# Patient Record
Sex: MALE | Race: WHITE | NOT HISPANIC OR LATINO | Employment: UNEMPLOYED | URBAN - METROPOLITAN AREA
[De-identification: names, ages, dates, MRNs, and addresses within clinical notes are randomized per-mention and may not be internally consistent; named-entity substitution may affect disease eponyms.]

---

## 2023-10-30 DIAGNOSIS — E11.69 TYPE 2 DIABETES MELLITUS WITH OTHER SPECIFIED COMPLICATION, WITHOUT LONG-TERM CURRENT USE OF INSULIN (MULTI): Primary | ICD-10-CM

## 2023-10-30 RX ORDER — ORAL SEMAGLUTIDE 7 MG/1
7 TABLET ORAL DAILY
Qty: 90 TABLET | Refills: 3 | Status: SHIPPED | OUTPATIENT
Start: 2023-10-30 | End: 2024-04-12 | Stop reason: SDUPTHER

## 2023-11-20 PROCEDURE — 88304 TISSUE EXAM BY PATHOLOGIST: CPT

## 2023-11-20 PROCEDURE — 88304 TISSUE EXAM BY PATHOLOGIST: CPT | Performed by: PATHOLOGY

## 2023-11-21 ENCOUNTER — LAB REQUISITION (OUTPATIENT)
Dept: LAB | Facility: HOSPITAL | Age: 68
End: 2023-11-21
Payer: COMMERCIAL

## 2023-11-29 LAB
LABORATORY COMMENT REPORT: NORMAL
PATH REPORT.FINAL DX SPEC: NORMAL
PATH REPORT.GROSS SPEC: NORMAL
PATH REPORT.TOTAL CANCER: NORMAL

## 2023-12-29 ENCOUNTER — APPOINTMENT (OUTPATIENT)
Dept: PRIMARY CARE | Facility: CLINIC | Age: 68
End: 2023-12-29
Payer: COMMERCIAL

## 2023-12-29 PROBLEM — M72.0 DUPUYTREN'S DISEASE: Status: ACTIVE | Noted: 2023-12-29

## 2023-12-29 PROBLEM — C44.519 BASAL CELL CARCINOMA OF SKIN OF OTHER PART OF TRUNK: Status: ACTIVE | Noted: 2022-04-11

## 2023-12-29 PROBLEM — L90.5 SCAR CONDITION AND FIBROSIS OF SKIN: Status: ACTIVE | Noted: 2022-04-11

## 2023-12-29 PROBLEM — I10 ESSENTIAL (PRIMARY) HYPERTENSION: Status: ACTIVE | Noted: 2021-07-27

## 2023-12-29 RX ORDER — METFORMIN HYDROCHLORIDE 500 MG/1
500 TABLET ORAL
COMMUNITY
End: 2024-02-21 | Stop reason: SDUPTHER

## 2023-12-29 RX ORDER — ISOPROPYL ALCOHOL 0.75 G/1
SWAB TOPICAL
COMMUNITY
Start: 2023-09-11 | End: 2024-05-15

## 2023-12-29 RX ORDER — ATORVASTATIN CALCIUM 10 MG/1
10 TABLET, FILM COATED ORAL DAILY
COMMUNITY
End: 2024-01-10

## 2023-12-29 RX ORDER — LEVOCETIRIZINE DIHYDROCHLORIDE 5 MG/1
5 TABLET, FILM COATED ORAL
COMMUNITY

## 2023-12-29 RX ORDER — GABAPENTIN 300 MG/1
300 CAPSULE ORAL
COMMUNITY
End: 2024-04-12 | Stop reason: SDUPTHER

## 2023-12-29 RX ORDER — TAMSULOSIN HYDROCHLORIDE 0.4 MG/1
0.4 CAPSULE ORAL
COMMUNITY
End: 2024-02-15

## 2023-12-29 RX ORDER — FLUTICASONE PROPIONATE 50 MCG
SPRAY, SUSPENSION (ML) NASAL
COMMUNITY

## 2023-12-29 RX ORDER — LORATADINE 10 MG/1
10 TABLET ORAL
COMMUNITY
Start: 2023-09-08

## 2023-12-29 RX ORDER — GLIMEPIRIDE 4 MG/1
4 TABLET ORAL
COMMUNITY
Start: 2017-04-21 | End: 2024-03-13 | Stop reason: SDUPTHER

## 2024-01-02 ENCOUNTER — OFFICE VISIT (OUTPATIENT)
Dept: PRIMARY CARE | Facility: CLINIC | Age: 69
End: 2024-01-02
Payer: COMMERCIAL

## 2024-01-02 VITALS
RESPIRATION RATE: 16 BRPM | OXYGEN SATURATION: 98 % | TEMPERATURE: 97.3 F | DIASTOLIC BLOOD PRESSURE: 80 MMHG | HEART RATE: 80 BPM | SYSTOLIC BLOOD PRESSURE: 122 MMHG | WEIGHT: 231 LBS

## 2024-01-02 DIAGNOSIS — E11.65 TYPE 2 DIABETES MELLITUS WITH HYPERGLYCEMIA, WITHOUT LONG-TERM CURRENT USE OF INSULIN (MULTI): ICD-10-CM

## 2024-01-02 DIAGNOSIS — Z00.00 MEDICARE ANNUAL WELLNESS VISIT, SUBSEQUENT: ICD-10-CM

## 2024-01-02 DIAGNOSIS — I10 ESSENTIAL (PRIMARY) HYPERTENSION: ICD-10-CM

## 2024-01-02 DIAGNOSIS — N40.1 BENIGN PROSTATIC HYPERPLASIA WITH URINARY FREQUENCY: ICD-10-CM

## 2024-01-02 DIAGNOSIS — R35.0 BENIGN PROSTATIC HYPERPLASIA WITH URINARY FREQUENCY: ICD-10-CM

## 2024-01-02 DIAGNOSIS — E78.5 HYPERLIPIDEMIA, UNSPECIFIED HYPERLIPIDEMIA TYPE: ICD-10-CM

## 2024-01-02 DIAGNOSIS — R23.8 OTHER SKIN CHANGES: Primary | ICD-10-CM

## 2024-01-02 DIAGNOSIS — M72.0 DUPUYTREN'S DISEASE: ICD-10-CM

## 2024-01-02 DIAGNOSIS — C44.519 BASAL CELL CARCINOMA OF SKIN OF OTHER PART OF TRUNK: ICD-10-CM

## 2024-01-02 DIAGNOSIS — M24.541 CONTRACTURE OF HAND JOINT, RIGHT: ICD-10-CM

## 2024-01-02 DIAGNOSIS — Z29.11 NEED FOR RSV VACCINATION: ICD-10-CM

## 2024-01-02 DIAGNOSIS — L90.5 SCAR CONDITION AND FIBROSIS OF SKIN: ICD-10-CM

## 2024-01-02 PROCEDURE — 3074F SYST BP LT 130 MM HG: CPT | Performed by: FAMILY MEDICINE

## 2024-01-02 PROCEDURE — 1159F MED LIST DOCD IN RCRD: CPT | Performed by: FAMILY MEDICINE

## 2024-01-02 PROCEDURE — 3079F DIAST BP 80-89 MM HG: CPT | Performed by: FAMILY MEDICINE

## 2024-01-02 PROCEDURE — 1036F TOBACCO NON-USER: CPT | Performed by: FAMILY MEDICINE

## 2024-01-02 PROCEDURE — 99214 OFFICE O/P EST MOD 30 MIN: CPT | Performed by: FAMILY MEDICINE

## 2024-01-02 RX ORDER — TADALAFIL 5 MG/1
TABLET ORAL
COMMUNITY

## 2024-01-02 ASSESSMENT — ENCOUNTER SYMPTOMS
OCCASIONAL FEELINGS OF UNSTEADINESS: 0
DEPRESSION: 1
LOSS OF SENSATION IN FEET: 1

## 2024-01-02 NOTE — PROGRESS NOTES
"Subjective   Patient ID: Valdemar Cohen is a 68 y.o. male who presents for Follow-up (Follow up from surgery /Med refills/Rsv shot ).  ILIANA IS HERE WITH MEDICAL QUESTIONS:    AWV 2022    QUESTIONS:   1--PT HAD RT HAND CONTRACTURE RELEASE SURGERY WITH DR AMBROSIO: 2023 AND NO COVERAGE FOR THERAPIES.  #4-5 VISITS WITH DR AMBROSIO.    [facility] COVERAGE FOR AFTER CARE - YOU NEED TO MAKE A STINK.    2--DRY HEAVING RELIEF PILL NOT COVERED.  PT GOT AN EXTRA SHOT.    3--ASKS FOR FLU SHOT.  \"I NEVER GET THE FLU...\"  PLZ GET FLU SHOT.   4--PLEASE GET AREZVY RSV SHOT.  Sent order.  5--look at fibromas at left lateral corner of left eye: to derm  6--yes keep on tadalafil 5 mg every day.            FROM MEDENT:  IMS: Date: 22   HISTORY SUMMARY - [facility] INTERNAL MEDICINE SPEC       Name:  Valdemar Choen                             Acct#: [id number]    Sex: M  : 1955  Age: 66 years           PMH:  Immun/Inj. Record:  91300-Covid 19 Pfizer Sars-Cov-2 vac mRNA, LNP-S, PF, 30 mcg/ 0.3 mL 21  -Inj Rocephin 250 MG NDC [id number] 17  90750-Shingrix (Shingles) Vaccine Recombinant  [address]-23 19  29860-Idfrujcg  0.65 ML [id number] 17  Health Maintenance:  Colonoscopy - LONG TIME AGO   Psa Test - DR AGUILAR HANDLING   Tetanus - WITHIN 10 YRS   Flu Shot - DOES NOT GET   Pnmovax -   EKG - (2017) NSR 83  HgbA1C Screening - (2020) aic:2020: 5.7%; 3/'19:7.6%; : 5.2%; : 7.6%  Zostavax - 2017  Shingrix - (2018)  [id number] - ? WHEN ADMINISTERED   : FLU SHOT ENCOURAGED:  PT DID NOT GET THIS SHINGLES SHOT DONE;  [id number] AND PNVAX 23 UP TO DATE AT [facility]  HgbA1C Screening - (5/10/2022) 6.6% M AY   Micro Albumin - (2022) UR ALB <7.0 & UR Cr 113.0 LOOKS OK...  Medical Problems:  Arthritis - ? LEFT SHOULDER DISLOCATIONS  Headache, Migraine  Prostate Disease - UR RETENTION S/P SURGERY DR AGUILAR  BENIGN PROSTATIC " HYPERTROPHY  Surgical Hx:  Knee Surgery - DR HODGES   Bladder Surgery - 0CT 2015  Wrist - LEFT GLASS LACERATION LEFT THUMB RECONSTRUCTION    FH:  Father: UNKNOWN  DAD AND MOM LEFT HIM AT EARLY AGE .    SH:  Marital: Single.Occupation: [employer].Sleep: Typically sleeps 8 hours a night.  Personal Habits:  Cigarette Use: Never Smoked Cigarettes.Alcohol: Occasional Alcoholic Beverage.Exercise Type: Exercises sporadically.          Review of Systems    Objective   Physical Exam  Vitals and nursing note reviewed.   Constitutional:       Appearance: Normal appearance.      Comments: Pt has ? Fibroma at left eye lateral corner and ? Ulcerated?  Hx of basal cell and other spots across his face.  Missing many teeth and engaging well.      HENT:      Head: Normocephalic.      Right Ear: Tympanic membrane, ear canal and external ear normal.      Left Ear: Tympanic membrane, ear canal and external ear normal.      Nose: Nose normal.      Mouth/Throat:      Mouth: Mucous membranes are moist.   Eyes:      Extraocular Movements: Extraocular movements intact.      Conjunctiva/sclera: Conjunctivae normal.      Pupils: Pupils are equal, round, and reactive to light.   Cardiovascular:      Rate and Rhythm: Normal rate and regular rhythm.      Pulses: Normal pulses.      Heart sounds: Normal heart sounds.   Pulmonary:      Effort: Pulmonary effort is normal.      Breath sounds: Normal breath sounds.   Abdominal:      General: Bowel sounds are normal.      Palpations: Abdomen is soft.   Musculoskeletal:         General: Normal range of motion.      Cervical back: Normal range of motion and neck supple.      Comments: SEE ABOVE:  PUFFINESS AT MCP JOINTS ACROSS RT PALM AT 4-5TH FINGERS    Skin:     General: Skin is warm and dry.      Comments: Changes as above;  palmar LEFT HAND ALONG THE 4TH RAY PUFFINESS TENDER AND TENSE WITH HEALED #4 INCISIONS.     Neurological:      General: No focal deficit present.   Psychiatric:         Mood and Affect:  Mood normal.         Assessment/Plan   Diagnoses and all orders for this visit:  Other skin changes  -     Referral to Dermatology  Medicare annual wellness visit, subsequent  -     Follow Up In Primary Care - Medicare Annual; Future  Contracture of hand joint, right  -     Referral to Physical Therapy; Future  Need for RSV vaccination  -     RSV, Recombinant, 60 years and older (AREXVY)  Basal cell carcinoma of skin of other part of trunk  Dupuytren's disease  Essential (primary) hypertension  Scar condition and fibrosis of skin  Benign prostatic hyperplasia with urinary frequency             .VS

## 2024-01-10 ENCOUNTER — APPOINTMENT (OUTPATIENT)
Dept: PHYSICAL THERAPY | Facility: CLINIC | Age: 69
End: 2024-01-10
Payer: COMMERCIAL

## 2024-01-10 DIAGNOSIS — N40.1 BENIGN PROSTATIC HYPERPLASIA WITH URINARY FREQUENCY: ICD-10-CM

## 2024-01-10 DIAGNOSIS — I10 ESSENTIAL (PRIMARY) HYPERTENSION: ICD-10-CM

## 2024-01-10 DIAGNOSIS — E78.5 HYPERLIPIDEMIA, UNSPECIFIED HYPERLIPIDEMIA TYPE: ICD-10-CM

## 2024-01-10 DIAGNOSIS — R35.0 BENIGN PROSTATIC HYPERPLASIA WITH URINARY FREQUENCY: ICD-10-CM

## 2024-01-10 DIAGNOSIS — E11.65 TYPE 2 DIABETES MELLITUS WITH HYPERGLYCEMIA, WITHOUT LONG-TERM CURRENT USE OF INSULIN (MULTI): ICD-10-CM

## 2024-01-10 RX ORDER — ATORVASTATIN CALCIUM 10 MG/1
10 TABLET, FILM COATED ORAL DAILY
Qty: 90 TABLET | Refills: 0 | Status: SHIPPED | OUTPATIENT
Start: 2024-01-10 | End: 2024-04-12 | Stop reason: SDUPTHER

## 2024-01-15 ENCOUNTER — EVALUATION (OUTPATIENT)
Dept: PHYSICAL THERAPY | Facility: CLINIC | Age: 69
End: 2024-01-15
Payer: COMMERCIAL

## 2024-01-15 DIAGNOSIS — I10 ESSENTIAL (PRIMARY) HYPERTENSION: ICD-10-CM

## 2024-01-15 DIAGNOSIS — E11.65 TYPE 2 DIABETES MELLITUS WITH HYPERGLYCEMIA, WITHOUT LONG-TERM CURRENT USE OF INSULIN (MULTI): ICD-10-CM

## 2024-01-15 DIAGNOSIS — R35.0 BENIGN PROSTATIC HYPERPLASIA WITH URINARY FREQUENCY: ICD-10-CM

## 2024-01-15 DIAGNOSIS — N40.1 BENIGN PROSTATIC HYPERPLASIA WITH URINARY FREQUENCY: ICD-10-CM

## 2024-01-15 DIAGNOSIS — M24.541 CONTRACTURE OF HAND JOINT, RIGHT: ICD-10-CM

## 2024-01-15 PROCEDURE — 97162 PT EVAL MOD COMPLEX 30 MIN: CPT | Performed by: PHYSICAL THERAPIST

## 2024-01-15 PROCEDURE — 97140 MANUAL THERAPY 1/> REGIONS: CPT | Performed by: PHYSICAL THERAPIST

## 2024-01-15 NOTE — PROGRESS NOTES
Physical Therapy     Physical Therapy Evaluation and Treatment      Patient Name:   Valdemar Cohen   1955     Encounter Diagnoses   Name Primary?    Contracture of hand joint, right     Essential (primary) hypertension     Benign prostatic hyperplasia with urinary frequency     Type 2 diabetes mellitus with hyperglycemia, without long-term current use of insulin (CMS/[facility])         THERAPY VISIT NUMBER:    1    Today's Date:   1-15-24    REFERRING DR. SHANE     SUBJECTIVE:       PATIENT HAD  P/O RIGHT HAND SURGERY FOR CONTRACTURES BY DR AMBROSIO IN NOV 2023----HE LAST SAW DR AMBROSIO     GOALS:   DECREASE RIGHT HAND CONTRACTURE--D DAILY     OBJECTIVE:  SEVERE 4TH AND 5TH FINGER CONTRACTURE     ASSESSMENT:  TRY 2 X WEEKLY FOR RIGHT HAND ROM    PLAN AND TREATMENT:  SEE FLOW SHEET PROGRAM IN CHART:    1-2 X WEEKLY

## 2024-01-18 ENCOUNTER — TREATMENT (OUTPATIENT)
Dept: PHYSICAL THERAPY | Facility: CLINIC | Age: 69
End: 2024-01-18
Payer: COMMERCIAL

## 2024-01-18 DIAGNOSIS — M24.541 CONTRACTURE OF HAND JOINT, RIGHT: Primary | ICD-10-CM

## 2024-01-18 PROCEDURE — 97035 APP MDLTY 1+ULTRASOUND EA 15: CPT | Performed by: PHYSICAL THERAPIST

## 2024-01-18 PROCEDURE — 97110 THERAPEUTIC EXERCISES: CPT | Performed by: PHYSICAL THERAPIST

## 2024-01-18 NOTE — PROGRESS NOTES
Physical Therapy     Physical Therapy Evaluation and Treatment      Patient Name:  Valdemar Cohen   1955    Encounter Diagnosis   Name Primary?    Contracture of hand joint, right Yes        THERAPY VISIT NUMBER:   2    Today's Date: 1-18-24    REFERRING DR. CENTENO     SUBJECTIVE:         SEVERE RIGHT HAND SCAR TISSUE     GOALS:DECREASE HAND CONTRACTURE SO 4TH AND 5TH FINGERS MOVE BETTER     OBJECTIVE: SEE PICTURE     ASSESSMENT: DEBILITY    PLAN AND TREATMENT:  SEE FLOW SHEET PROGRAM IN CHART:    1-2 X WEEKLY

## 2024-01-22 ENCOUNTER — APPOINTMENT (OUTPATIENT)
Dept: PHYSICAL THERAPY | Facility: CLINIC | Age: 69
End: 2024-01-22
Payer: COMMERCIAL

## 2024-01-25 ENCOUNTER — DOCUMENTATION (OUTPATIENT)
Dept: PHYSICAL THERAPY | Facility: CLINIC | Age: 69
End: 2024-01-25

## 2024-01-25 NOTE — PROGRESS NOTES
Physical Therapy                 Therapy Communication Note    Patient Name: Valdemar Cohen  MRN: [id number]  Today's Date: 2024         Comment:  CANCELLED--I HAVE A FAMILY  TO GO TO TODAY

## 2024-02-02 ENCOUNTER — APPOINTMENT (OUTPATIENT)
Dept: PRIMARY CARE | Facility: CLINIC | Age: 69
End: 2024-02-02
Payer: COMMERCIAL

## 2024-02-08 ENCOUNTER — APPOINTMENT (OUTPATIENT)
Dept: PHYSICAL THERAPY | Facility: CLINIC | Age: 69
End: 2024-02-08
Payer: COMMERCIAL

## 2024-02-15 DIAGNOSIS — N40.1 BENIGN PROSTATIC HYPERPLASIA WITH URINARY FREQUENCY: ICD-10-CM

## 2024-02-15 DIAGNOSIS — R35.0 BENIGN PROSTATIC HYPERPLASIA WITH URINARY FREQUENCY: ICD-10-CM

## 2024-02-15 DIAGNOSIS — E11.65 TYPE 2 DIABETES MELLITUS WITH HYPERGLYCEMIA, WITHOUT LONG-TERM CURRENT USE OF INSULIN (MULTI): ICD-10-CM

## 2024-02-15 DIAGNOSIS — L90.5 SCAR CONDITION AND FIBROSIS OF SKIN: Primary | ICD-10-CM

## 2024-02-15 DIAGNOSIS — I10 ESSENTIAL (PRIMARY) HYPERTENSION: ICD-10-CM

## 2024-02-15 RX ORDER — DOXYCYCLINE HYCLATE 50 MG/1
50 CAPSULE ORAL
Qty: 90 CAPSULE | Refills: 0 | Status: SHIPPED | OUTPATIENT
Start: 2024-02-15 | End: 2024-04-24

## 2024-02-15 RX ORDER — TAMSULOSIN HYDROCHLORIDE 0.4 MG/1
CAPSULE ORAL
Qty: 180 CAPSULE | Refills: 0 | Status: SHIPPED | OUTPATIENT
Start: 2024-02-15 | End: 2024-04-12 | Stop reason: SDUPTHER

## 2024-02-19 ENCOUNTER — DOCUMENTATION (OUTPATIENT)
Dept: PHYSICAL THERAPY | Facility: CLINIC | Age: 69
End: 2024-02-19
Payer: COMMERCIAL

## 2024-02-19 DIAGNOSIS — M24.541 CONTRACTURE OF HAND JOINT, RIGHT: Primary | ICD-10-CM

## 2024-02-19 NOTE — PROGRESS NOTES
Physical Therapy    Discharge Summary       Encounter Diagnosis   Name Primary?    Contracture of hand joint, right Yes      MRN: [id number]  : 1955  Date: 24        Rehab Discharge Reason: Other PATIENT WAS SCHEDULED TO HAVE A SECOND SURGERY ON HIS HAND DUE TO SEVERE CONTRACTURES---PLAN--D/C PT UNTIL AFTER HIS SECOND SURGERY

## 2024-02-21 ENCOUNTER — OFFICE VISIT (OUTPATIENT)
Dept: PRIMARY CARE | Facility: CLINIC | Age: 69
End: 2024-02-21
Payer: COMMERCIAL

## 2024-02-21 VITALS
TEMPERATURE: 98.6 F | SYSTOLIC BLOOD PRESSURE: 110 MMHG | DIASTOLIC BLOOD PRESSURE: 80 MMHG | HEART RATE: 76 BPM | WEIGHT: 230 LBS

## 2024-02-21 DIAGNOSIS — M72.0 DUPUYTREN'S DISEASE: Primary | ICD-10-CM

## 2024-02-21 DIAGNOSIS — I10 ESSENTIAL (PRIMARY) HYPERTENSION: ICD-10-CM

## 2024-02-21 DIAGNOSIS — R35.0 BENIGN PROSTATIC HYPERPLASIA WITH URINARY FREQUENCY: ICD-10-CM

## 2024-02-21 DIAGNOSIS — E11.65 TYPE 2 DIABETES MELLITUS WITH HYPERGLYCEMIA, WITHOUT LONG-TERM CURRENT USE OF INSULIN (MULTI): ICD-10-CM

## 2024-02-21 DIAGNOSIS — N40.1 BENIGN PROSTATIC HYPERPLASIA WITH URINARY FREQUENCY: ICD-10-CM

## 2024-02-21 DIAGNOSIS — Z00.00 MEDICARE ANNUAL WELLNESS VISIT, SUBSEQUENT: ICD-10-CM

## 2024-02-21 PROCEDURE — 3079F DIAST BP 80-89 MM HG: CPT | Performed by: FAMILY MEDICINE

## 2024-02-21 PROCEDURE — 1036F TOBACCO NON-USER: CPT | Performed by: FAMILY MEDICINE

## 2024-02-21 PROCEDURE — 1159F MED LIST DOCD IN RCRD: CPT | Performed by: FAMILY MEDICINE

## 2024-02-21 PROCEDURE — 3074F SYST BP LT 130 MM HG: CPT | Performed by: FAMILY MEDICINE

## 2024-02-21 PROCEDURE — 99214 OFFICE O/P EST MOD 30 MIN: CPT | Performed by: FAMILY MEDICINE

## 2024-02-21 RX ORDER — METFORMIN HYDROCHLORIDE 500 MG/1
1000 TABLET ORAL
Qty: 360 TABLET | Refills: 3 | Status: SHIPPED | OUTPATIENT
Start: 2024-02-21 | End: 2024-04-12 | Stop reason: SDUPTHER

## 2024-02-21 NOTE — PROGRESS NOTES
Subjective   Patient ID: Valdemar Cohen is a 68 y.o. male who presents for Diabetes (Discuss lowering A1c. Had surgery cancelled due to high A1c. ).  PT HAD CONTRACTURE REPAIR IN THE PAST AND NOW WORSE THAN EVER.   Discuss lowering A1c. Had surgery cancelled due to high A1c.  CONCERNS FOR INFXN AND NEEDS CARE.    EDUC FOR INSULIN RESISTANCE AND CARE FOR DM2.   TESTING SUGARS HIGHER IN THE Ams.   TRIED DIET CHANGES:  NO CANDY PIE Jacqueline.   MORE EXERCISE TOO.    GOAL AIC:  UNDER 8%.    PREOP TESTING AIC 9.0%  INTERVAL DERM CARE BY [facility] FROZE SPOT ON FACE AND CAUTERY ON DORSAL LEFT WRIST HEALING SCAB WELL NOW.      Diabetes    TX:   RYBELSUS 7 MG MIGHT USE 14 MG every day MAX DOSE.    [SEMAGLUTIDE/WEGOVY/OZEMPIC]  METFORMIN 1000 BID  GLIMEPIRIDE 4 MG Q AM    OPTIONS FOR ADDITIONAL DM2 TX:    JARDIANCE/FARXIGA   ACTOS  STARLIX [hazardous]   LANTUS INSULIN as A LAST RESORT.      RETRUN IN 1 MONTH. GET AIC AT NEXT OV    Review of Systems   All other systems reviewed and are negative.      Objective   Physical Exam  Vitals and nursing note reviewed.   Constitutional:       Appearance: Normal appearance.      Comments: MISSING TEETH AND OK BREATH.  HEENT OK & CVS AND LUNGS ABD AND NO CVAT.  SKIN SCABS AND SCARING NOTED.  TALKATIVE LIKE ALWAYS.           Assessment/Plan   Diagnoses and all orders for this visit:  Dupuytren's disease  Medicare annual wellness visit, subsequent  Type 2 diabetes mellitus with hyperglycemia, without long-term current use of insulin (CMS/[facility])  -     metFORMIN (Glucophage) 500 mg tablet; Take 2 tablets (1,000 mg) by mouth 2 times a day with meals.  -     empagliflozin (Jardiance) 10 mg; Take 1 tablet (10 mg) by mouth once daily.  Essential (primary) hypertension  -     metFORMIN (Glucophage) 500 mg tablet; Take 2 tablets (1,000 mg) by mouth 2 times a day with meals.  Benign prostatic hyperplasia with urinary frequency  -     metFORMIN (Glucophage) 500 mg tablet; Take 2 tablets (1,000 mg) by mouth 2  times a day with meals.           .VS

## 2024-02-21 NOTE — PATIENT INSTRUCTIONS
WE ADDED TREATMENT FOR DIABETES TODAY TO KEEP ON MEDICATIONS:   TX: JARDIANCE 25 MG ONCE A DAY.   MAINTAIN TREATMENT:   RYBELSUS 7 MG [MIGHT USE 14 MG every day MAX DOSE. ] [SAME FAMILY as SEMAGLUTIDE/WEGOVY/OZEMPIC]  METFORMIN 1000 TWICE A DAY.    GLIMEPIRIDE 4 MG EVERY AM:  IF YOU SKIP A MEAL THEN SKIP THIS PILL.      OPTIONS FOR ADDITIONAL DM2 TX:    MAX RYBELSUS TO 14 MG DAILY   JARDIANCE/FARXIGA   ACTOS  STARLIX [hazardous]   LANTUS INSULIN as A LAST RESORT.]      GOAL A1c FOR SURGERY IS < 8%.     CALL FOR NEEDS [phone].

## 2024-03-13 ENCOUNTER — OFFICE VISIT (OUTPATIENT)
Dept: PRIMARY CARE | Facility: CLINIC | Age: 69
End: 2024-03-13
Payer: COMMERCIAL

## 2024-03-13 VITALS
SYSTOLIC BLOOD PRESSURE: 117 MMHG | DIASTOLIC BLOOD PRESSURE: 79 MMHG | RESPIRATION RATE: 16 BRPM | WEIGHT: 228 LBS | TEMPERATURE: 96.7 F | OXYGEN SATURATION: 96 % | HEART RATE: 74 BPM

## 2024-03-13 DIAGNOSIS — N40.1 BENIGN PROSTATIC HYPERPLASIA WITH URINARY FREQUENCY: ICD-10-CM

## 2024-03-13 DIAGNOSIS — Z12.11 COLON CANCER SCREENING: Primary | ICD-10-CM

## 2024-03-13 DIAGNOSIS — R35.0 BENIGN PROSTATIC HYPERPLASIA WITH URINARY FREQUENCY: ICD-10-CM

## 2024-03-13 DIAGNOSIS — M72.0 DUPUYTREN'S DISEASE: ICD-10-CM

## 2024-03-13 DIAGNOSIS — E11.65 TYPE 2 DIABETES MELLITUS WITH HYPERGLYCEMIA, WITHOUT LONG-TERM CURRENT USE OF INSULIN (MULTI): ICD-10-CM

## 2024-03-13 DIAGNOSIS — I10 ESSENTIAL (PRIMARY) HYPERTENSION: ICD-10-CM

## 2024-03-13 LAB
POC FINGERSTICK BLOOD GLUCOSE: 173 MG/DL (ref 70–100)
POC HEMOGLOBIN A1C: 8.7 % (ref 4.2–6.5)

## 2024-03-13 PROCEDURE — 1036F TOBACCO NON-USER: CPT | Performed by: FAMILY MEDICINE

## 2024-03-13 PROCEDURE — 1159F MED LIST DOCD IN RCRD: CPT | Performed by: FAMILY MEDICINE

## 2024-03-13 PROCEDURE — 3078F DIAST BP <80 MM HG: CPT | Performed by: FAMILY MEDICINE

## 2024-03-13 PROCEDURE — 83036 HEMOGLOBIN GLYCOSYLATED A1C: CPT | Performed by: FAMILY MEDICINE

## 2024-03-13 PROCEDURE — 82962 GLUCOSE BLOOD TEST: CPT | Performed by: FAMILY MEDICINE

## 2024-03-13 PROCEDURE — 3074F SYST BP LT 130 MM HG: CPT | Performed by: FAMILY MEDICINE

## 2024-03-13 PROCEDURE — 99214 OFFICE O/P EST MOD 30 MIN: CPT | Performed by: FAMILY MEDICINE

## 2024-03-13 RX ORDER — FLASH GLUCOSE SENSOR
KIT MISCELLANEOUS
Qty: 1 EACH | Refills: 0 | Status: SHIPPED | OUTPATIENT
Start: 2024-03-13

## 2024-03-13 RX ORDER — FLASH GLUCOSE SCANNING READER
EACH MISCELLANEOUS
Qty: 1 EACH | Refills: 0 | Status: SHIPPED | OUTPATIENT
Start: 2024-03-13

## 2024-03-13 RX ORDER — GLIMEPIRIDE 4 MG/1
4 TABLET ORAL 2 TIMES DAILY
Qty: 180 TABLET | Refills: 3 | Status: SHIPPED | OUTPATIENT
Start: 2024-03-13 | End: 2024-04-12 | Stop reason: SDUPTHER

## 2024-03-13 NOTE — PATIENT INSTRUCTIONS
Keep on best diet and nutrition consulted.    Keep on medications especially for diabetes:    SUGARS RUNNING 130-170 RANGE.    AIC GOAL < 7% today 8.9%   JARDIANCE ONCE A DAY.   AMARYL 4 MG FOR BREAKFAST AND DINNER.  METFORMIN FOR BREAKFAST AND DINNER  RYBELSUS ONCE A DAY   .    Pt must lower aic to clear for hand surgery, more aggressive tx may trigger severe hypoglycemia so pt has medical need for CGM and care to prevent harm of low sugars.      Follow up with ANASTACIO CNP for diabetes education.  Diet controls and may need insulin tx.

## 2024-03-13 NOTE — PROGRESS NOTES
Subjective   Patient ID: Valdemar Cohen is a 68 y.o. male who presents for Follow-up (NEEDS A1C CHECKED FOR SURGERY ).  HPI  NEEDS DM2 CARE TO GET HAND SURGERY REVISION PER DR AMBROSIO   FASTING AND DIETARY INDISCRETIONS ARE BOTH LIKELY TO TRIGGER ELEVATED SUGARS.    SUGARS RUNNING 130-170 RANGE.    AIC GOAL < 7% today 8.9%   JARDIANCE ONCE A DAY.   AMARYL 4 MG FOR BREAKFAST AND DINNER.  METFORMIN FOR BREAKFAST AND DINNER  RYBELSUS ONCE A DAY   OFF ONE MED RECENTLY.      MUST DO THE PHYS TX POST OP THIS TIME AND NEXT TIME TOO.    2/'24: TOLD BY DR AMBROSIO TO HOLD RYBELSUS FOR 1 WEEK PREOP.  - PGS     NOW AND AIC 8.7%   NO LOW GLC REPORTED.      MIGHT NEED every day LANTUS 10 UNITS CONCERNS FOR WT GAIN AND HYPOGLYCEMIA.        Review of Systems   All other systems reviewed and are negative.      Objective   Physical Exam  Vitals reviewed.   Constitutional:       Appearance: Normal appearance. He is obese.   HENT:      Head: Normocephalic.      Right Ear: Tympanic membrane, ear canal and external ear normal.      Left Ear: Tympanic membrane, ear canal and external ear normal.      Nose: Nose normal.      Mouth/Throat:      Mouth: Mucous membranes are moist.      Pharynx: Oropharynx is clear.   Eyes:      Conjunctiva/sclera: Conjunctivae normal.      Pupils: Pupils are equal, round, and reactive to light.   Cardiovascular:      Rate and Rhythm: Normal rate and regular rhythm.      Pulses: Normal pulses.      Heart sounds: Normal heart sounds.   Pulmonary:      Effort: Pulmonary effort is normal.      Breath sounds: Normal breath sounds.   Abdominal:      General: Bowel sounds are normal.      Palpations: Abdomen is soft.   Musculoskeletal:         General: Normal range of motion.      Cervical back: Normal range of motion and neck supple.      Comments: Contractured 3rd ray across rt palm.     Skin:     General: Skin is warm and dry.   Neurological:      General: No focal deficit present.      Mental Status: He is  oriented to person, place, and time. Mental status is at baseline.   Psychiatric:         Mood and Affect: Mood normal.         Behavior: Behavior normal.         Thought Content: Thought content normal.         Judgment: Judgment normal.         Assessment/Plan   Diagnoses and all orders for this visit:  Colon cancer screening  -     Cologuard® colon cancer screening; Future  Type 2 diabetes mellitus with hyperglycemia, without long-term current use of insulin (CMS/[facility])  -     POCT glycosylated hemoglobin (Hb A1C) manually resulted  -     POCT Fingerstick Glucose manually resulted  -     glimepiride (Amaryl) 4 mg tablet; Take 1 tablet (4 mg) by mouth 2 times a day.  -     Follow Up In Primary Care - Established; Future  Essential (primary) hypertension  -     glimepiride (Amaryl) 4 mg tablet; Take 1 tablet (4 mg) by mouth 2 times a day.  Benign prostatic hyperplasia with urinary frequency  -     glimepiride (Amaryl) 4 mg tablet; Take 1 tablet (4 mg) by mouth 2 times a day.  Dupuytren's disease  -     Follow Up In Primary Care - Established; Future             .VS

## 2024-03-14 ENCOUNTER — TELEPHONE (OUTPATIENT)
Dept: PRIMARY CARE | Facility: CLINIC | Age: 69
End: 2024-03-14
Payer: COMMERCIAL

## 2024-03-26 NOTE — TELEPHONE ENCOUNTER
FREESTYLE DENIED  Decision Notes:  Continuous glucose monitor system (, transmitter, and sensor) is denied for not meeting the  prior authorization requirement(s). Product authorization requires the following:  (1) Submission of medical records (for example: chart notes, laboratory values) or claims history  documenting one of the following:  (A) You are being treated with insulin.  (B) You have a history of problematic hypoglycemia with documentation of recurrent level 2  hypoglycemic events [glucose less than 54mg/dl (3.0 mmol/L)] that persist despite multiple attempts to  adjust medication(s) and/or modify the diabetes treatment plan.  (C) You have a history of problematic hypoglycemia with documentation of a history of a level 3  hypoglycemic event [glucose less than 54mg/dl (3.0 mmol/L)] characterized by altered mental and/or  physical state requiring third-party assistance for treatment of hypoglycemia.  (2) The provider attests that you have had an in-person or Medicare-approved telehealth

## 2024-03-28 NOTE — TELEPHONE ENCOUNTER
Requested again for auth - sent records - had sent them last time also.  Noted that patient required  due to hypoglycemia

## 2024-04-12 ENCOUNTER — OFFICE VISIT (OUTPATIENT)
Dept: PRIMARY CARE | Facility: CLINIC | Age: 69
End: 2024-04-12
Payer: COMMERCIAL

## 2024-04-12 VITALS
DIASTOLIC BLOOD PRESSURE: 80 MMHG | RESPIRATION RATE: 16 BRPM | OXYGEN SATURATION: 96 % | WEIGHT: 230 LBS | TEMPERATURE: 96.9 F | SYSTOLIC BLOOD PRESSURE: 140 MMHG | BODY MASS INDEX: 32.93 KG/M2 | HEIGHT: 70 IN | HEART RATE: 80 BPM

## 2024-04-12 DIAGNOSIS — Z11.4 ENCOUNTER FOR SCREENING FOR HIV: ICD-10-CM

## 2024-04-12 DIAGNOSIS — Z00.01 ENCOUNTER FOR GENERAL ADULT MEDICAL EXAMINATION WITH ABNORMAL FINDINGS: Primary | ICD-10-CM

## 2024-04-12 DIAGNOSIS — M72.0 DUPUYTREN'S DISEASE: ICD-10-CM

## 2024-04-12 DIAGNOSIS — E55.9 VITAMIN D DEFICIENCY: ICD-10-CM

## 2024-04-12 DIAGNOSIS — N40.1 BENIGN PROSTATIC HYPERPLASIA WITH URINARY FREQUENCY: ICD-10-CM

## 2024-04-12 DIAGNOSIS — E11.69 TYPE 2 DIABETES MELLITUS WITH OTHER SPECIFIED COMPLICATION, WITHOUT LONG-TERM CURRENT USE OF INSULIN (MULTI): ICD-10-CM

## 2024-04-12 DIAGNOSIS — E78.5 HYPERLIPIDEMIA, UNSPECIFIED HYPERLIPIDEMIA TYPE: ICD-10-CM

## 2024-04-12 DIAGNOSIS — E11.65 TYPE 2 DIABETES MELLITUS WITH HYPERGLYCEMIA, WITHOUT LONG-TERM CURRENT USE OF INSULIN (MULTI): ICD-10-CM

## 2024-04-12 DIAGNOSIS — I10 ESSENTIAL (PRIMARY) HYPERTENSION: ICD-10-CM

## 2024-04-12 DIAGNOSIS — Z11.59 ENCOUNTER FOR HEPATITIS C SCREENING TEST FOR LOW RISK PATIENT: ICD-10-CM

## 2024-04-12 DIAGNOSIS — Z13.89 SCREENING FOR GOUT: ICD-10-CM

## 2024-04-12 DIAGNOSIS — R35.0 BENIGN PROSTATIC HYPERPLASIA WITH URINARY FREQUENCY: ICD-10-CM

## 2024-04-12 LAB
POC FINGERSTICK BLOOD GLUCOSE: 135 MG/DL (ref 70–100)
POC HEMOGLOBIN A1C: 6.8 % (ref 4.2–6.5)

## 2024-04-12 PROCEDURE — 83036 HEMOGLOBIN GLYCOSYLATED A1C: CPT | Performed by: FAMILY MEDICINE

## 2024-04-12 PROCEDURE — 99214 OFFICE O/P EST MOD 30 MIN: CPT | Performed by: FAMILY MEDICINE

## 2024-04-12 PROCEDURE — 82962 GLUCOSE BLOOD TEST: CPT | Performed by: FAMILY MEDICINE

## 2024-04-12 PROCEDURE — G0439 PPPS, SUBSEQ VISIT: HCPCS | Performed by: FAMILY MEDICINE

## 2024-04-12 PROCEDURE — 1159F MED LIST DOCD IN RCRD: CPT | Performed by: FAMILY MEDICINE

## 2024-04-12 PROCEDURE — 1160F RVW MEDS BY RX/DR IN RCRD: CPT | Performed by: FAMILY MEDICINE

## 2024-04-12 PROCEDURE — 3077F SYST BP >= 140 MM HG: CPT | Performed by: FAMILY MEDICINE

## 2024-04-12 PROCEDURE — 3079F DIAST BP 80-89 MM HG: CPT | Performed by: FAMILY MEDICINE

## 2024-04-12 PROCEDURE — 1170F FXNL STATUS ASSESSED: CPT | Performed by: FAMILY MEDICINE

## 2024-04-12 RX ORDER — TAMSULOSIN HYDROCHLORIDE 0.4 MG/1
CAPSULE ORAL
Qty: 180 CAPSULE | Refills: 3 | Status: SHIPPED | OUTPATIENT
Start: 2024-04-12

## 2024-04-12 RX ORDER — METFORMIN HYDROCHLORIDE 500 MG/1
1000 TABLET ORAL
Qty: 360 TABLET | Refills: 3 | Status: SHIPPED | OUTPATIENT
Start: 2024-04-12 | End: 2025-04-12

## 2024-04-12 RX ORDER — ATORVASTATIN CALCIUM 10 MG/1
10 TABLET, FILM COATED ORAL DAILY
Qty: 90 TABLET | Refills: 3 | Status: SHIPPED | OUTPATIENT
Start: 2024-04-12 | End: 2025-04-12

## 2024-04-12 RX ORDER — GLIMEPIRIDE 4 MG/1
4 TABLET ORAL 2 TIMES DAILY
Qty: 180 TABLET | Refills: 3 | Status: SHIPPED | OUTPATIENT
Start: 2024-04-12 | End: 2025-04-12

## 2024-04-12 RX ORDER — GABAPENTIN 300 MG/1
300 CAPSULE ORAL 2 TIMES DAILY PRN
Qty: 60 CAPSULE | Refills: 11 | Status: SHIPPED | OUTPATIENT
Start: 2024-04-12 | End: 2024-04-19 | Stop reason: SDUPTHER

## 2024-04-12 ASSESSMENT — ACTIVITIES OF DAILY LIVING (ADL)
DRESSING: INDEPENDENT
GROCERY_SHOPPING: INDEPENDENT
TAKING_MEDICATION: INDEPENDENT
MANAGING_FINANCES: INDEPENDENT
DOING_HOUSEWORK: INDEPENDENT
BATHING: INDEPENDENT

## 2024-04-12 ASSESSMENT — PATIENT HEALTH QUESTIONNAIRE - PHQ9
SUM OF ALL RESPONSES TO PHQ9 QUESTIONS 1 AND 2: 0
1. LITTLE INTEREST OR PLEASURE IN DOING THINGS: NOT AT ALL
2. FEELING DOWN, DEPRESSED OR HOPELESS: NOT AT ALL

## 2024-04-12 ASSESSMENT — ENCOUNTER SYMPTOMS
LOSS OF SENSATION IN FEET: 1
DEPRESSION: 0
OCCASIONAL FEELINGS OF UNSTEADINESS: 0

## 2024-04-12 NOTE — PROGRESS NOTES
Subjective   Patient ID: Valdemar Cohen is a 68 y.o. male who presents for Medicare Annual Wellness Visit Subsequent.  HPI  HERE FOR AWV  PALM SUGAR FAILED FOR LACK OF PHYS TX FLUP.   UPSET FOR HIS PALMAR HAND CONTRACTURE.    NEEDS AIC 7.1% OR LESS TO GET SURGERY DONE.    GOOD SUGAR 135.    AIC TODAY:  6.8% [8.2%]      JARDIANCE AND OTHER MEDS ONGOING.      12151:  11:17 TO 11:28        Review of Systems   All other systems reviewed and are negative.      Objective   Physical Exam  Vitals and nursing note reviewed.   Constitutional:       Appearance: Normal appearance.   HENT:      Head: Normocephalic.      Right Ear: Tympanic membrane, ear canal and external ear normal.      Left Ear: Tympanic membrane, ear canal and external ear normal.      Nose: Nose normal.      Mouth/Throat:      Mouth: Mucous membranes are moist.      Pharynx: Oropharynx is clear.   Eyes:      Conjunctiva/sclera: Conjunctivae normal.      Pupils: Pupils are equal, round, and reactive to light.   Cardiovascular:      Rate and Rhythm: Normal rate and regular rhythm.      Pulses: Normal pulses.      Heart sounds: Normal heart sounds.   Pulmonary:      Effort: Pulmonary effort is normal.      Breath sounds: Normal breath sounds.   Abdominal:      General: Bowel sounds are normal.   Musculoskeletal:         General: Normal range of motion.      Cervical back: Normal range of motion and neck supple.   Skin:     General: Skin is warm and dry.      Comments: CONTRACTURE RT PALM ALONG 4TH RAY.     Neurological:      General: No focal deficit present.      Mental Status: He is oriented to person, place, and time. Mental status is at baseline.   Psychiatric:         Mood and Affect: Mood normal.         Behavior: Behavior normal.         Thought Content: Thought content normal.         Judgment: Judgment normal.         Assessment/Plan   Diagnoses and all orders for this visit:  Encounter for general adult medical examination with abnormal  findings  -     Follow Up In Primary Care - Established; Future  -     Follow Up In Primary Care - Medicare Annual; Future  Type 2 diabetes mellitus with hyperglycemia, without long-term current use of insulin (Multi)  -     Follow Up In Primary Care - Established  -     POCT Fingerstick Glucose manually resulted  -     POCT glycosylated hemoglobin (Hb A1C) manually resulted  -     Referral to Orthopaedic Surgery; Future  -     Follow Up In Primary Care - Established; Future  -     Hemoglobin A1c; Future  -     HIV 1/2 Antigen/Antibody Screen with Reflex to Confirmation; Future  -     Hepatitis C antibody; Future  -     Prostate Specific Antigen, Screen; Future  -     Urinalysis with Reflex Microscopic; Future  -     TSH with reflex to Free T4 if abnormal; Future  -     Comprehensive Metabolic Panel; Future  -     CBC and Auto Differential; Future  Dupuytren's disease  -     Follow Up In Primary Care - Established  -     Referral to Orthopaedic Surgery; Future  Essential (primary) hypertension  -     Hemoglobin A1c; Future  -     Urinalysis with Reflex Microscopic; Future  -     TSH with reflex to Free T4 if abnormal; Future  -     Comprehensive Metabolic Panel; Future  -     CBC and Auto Differential; Future  Benign prostatic hyperplasia with urinary frequency  -     Prostate Specific Antigen, Screen; Future  Hyperlipidemia, unspecified hyperlipidemia type  Screening for gout  -     Uric acid; Future  Encounter for screening for HIV  -     HIV 1/2 Antigen/Antibody Screen with Reflex to Confirmation; Future  Encounter for hepatitis C screening test for low risk patient  -     Hepatitis C antibody; Future  Vitamin D deficiency  -     Vitamin D 25-Hydroxy,Total (for eval of Vitamin D levels); Future             .VS

## 2024-04-12 NOTE — PATIENT INSTRUCTIONS
KEEP ON MEDICATIONS.    GET FASTED BLOOD WORK DONE IN THE NEXT 3 MONTHS.  TRY JULY 2024.  VERY GOOD JOB ON YOUR BLOOD SUGAR CONTROL - KEEP GOING.    CALL DR AMBROSIO FOR SURGICAL APPOINTMENTS.    CALL FOR NEEDS [phone].

## 2024-04-19 ENCOUNTER — TELEPHONE (OUTPATIENT)
Dept: PRIMARY CARE | Facility: CLINIC | Age: 69
End: 2024-04-19
Payer: COMMERCIAL

## 2024-04-19 DIAGNOSIS — I10 ESSENTIAL (PRIMARY) HYPERTENSION: ICD-10-CM

## 2024-04-19 DIAGNOSIS — N40.1 BENIGN PROSTATIC HYPERPLASIA WITH URINARY FREQUENCY: ICD-10-CM

## 2024-04-19 DIAGNOSIS — R35.0 BENIGN PROSTATIC HYPERPLASIA WITH URINARY FREQUENCY: ICD-10-CM

## 2024-04-19 DIAGNOSIS — E11.65 TYPE 2 DIABETES MELLITUS WITH HYPERGLYCEMIA, WITHOUT LONG-TERM CURRENT USE OF INSULIN (MULTI): ICD-10-CM

## 2024-04-19 RX ORDER — GABAPENTIN 300 MG/1
300 CAPSULE ORAL 3 TIMES DAILY PRN
Qty: 90 CAPSULE | Refills: 3 | Status: SHIPPED | OUTPATIENT
Start: 2024-04-19 | End: 2024-08-17

## 2024-04-19 NOTE — TELEPHONE ENCOUNTER
Pharmacy sent over a fax stating that the gabapentin was 3 times a day and patient wasn't expecting a decrease in medication

## 2024-04-19 NOTE — PROGRESS NOTES
Subjective   Patient ID: Valdemar Cohen is a 68 y.o. male who presents for No chief complaint on file..  HPI    Review of Systems    Objective   Physical Exam    Assessment/Plan              .VS

## 2024-04-23 DIAGNOSIS — L90.5 SCAR CONDITION AND FIBROSIS OF SKIN: ICD-10-CM

## 2024-04-24 RX ORDER — DOXYCYCLINE HYCLATE 50 MG/1
50 CAPSULE ORAL
Qty: 90 CAPSULE | Refills: 0 | Status: SHIPPED | OUTPATIENT
Start: 2024-04-24

## 2024-05-13 PROCEDURE — 0752T DGTZ GLS MCRSCP SLD LVL III: CPT

## 2024-05-13 PROCEDURE — 88304 TISSUE EXAM BY PATHOLOGIST: CPT | Performed by: PATHOLOGY

## 2024-05-13 PROCEDURE — 88304 TISSUE EXAM BY PATHOLOGIST: CPT

## 2024-05-15 ENCOUNTER — LAB REQUISITION (OUTPATIENT)
Dept: LAB | Facility: HOSPITAL | Age: 69
End: 2024-05-15
Payer: COMMERCIAL

## 2024-05-15 DIAGNOSIS — I10 ESSENTIAL (PRIMARY) HYPERTENSION: ICD-10-CM

## 2024-05-15 DIAGNOSIS — R35.0 BENIGN PROSTATIC HYPERPLASIA WITH URINARY FREQUENCY: ICD-10-CM

## 2024-05-15 DIAGNOSIS — N40.1 BENIGN PROSTATIC HYPERPLASIA WITH URINARY FREQUENCY: ICD-10-CM

## 2024-05-15 DIAGNOSIS — E11.65 TYPE 2 DIABETES MELLITUS WITH HYPERGLYCEMIA, WITHOUT LONG-TERM CURRENT USE OF INSULIN (MULTI): ICD-10-CM

## 2024-05-15 DIAGNOSIS — M72.0 PALMAR FASCIAL FIBROMATOSIS (DUPUYTREN): ICD-10-CM

## 2024-05-15 RX ORDER — ISOPROPYL ALCOHOL 0.75 G/1
SWAB TOPICAL
Qty: 100 EACH | Refills: 11 | Status: SHIPPED | OUTPATIENT
Start: 2024-05-15

## 2024-05-21 LAB
LABORATORY COMMENT REPORT: NORMAL
PATH REPORT.FINAL DX SPEC: NORMAL
PATH REPORT.GROSS SPEC: NORMAL
PATH REPORT.RELEVANT HX SPEC: NORMAL
PATH REPORT.TOTAL CANCER: NORMAL

## 2024-06-04 ENCOUNTER — EVALUATION (OUTPATIENT)
Dept: PHYSICAL THERAPY | Facility: CLINIC | Age: 69
End: 2024-06-04
Payer: COMMERCIAL

## 2024-06-04 DIAGNOSIS — M24.541 CONTRACTURE OF HAND JOINT, RIGHT: Primary | ICD-10-CM

## 2024-06-04 PROCEDURE — 97110 THERAPEUTIC EXERCISES: CPT | Performed by: PHYSICAL THERAPIST

## 2024-06-04 PROCEDURE — 97162 PT EVAL MOD COMPLEX 30 MIN: CPT | Performed by: PHYSICAL THERAPIST

## 2024-06-04 NOTE — PROGRESS NOTES
Physical Therapy     Physical Therapy Evaluation and Treatment      Patient Name:  Valdemar Cohen   1955     Encounter Diagnosis   Name Primary?    Contracture of hand joint, right Yes        THERAPY VISIT NUMBER:   1    Today's Date:  6-4-24    REFERRING DR. MODESTA AMBROSIO     SUBJECTIVE:         P/O RIGHT HAND ON 5-13-24---SEE PICTURE OF SCAR TISSUE ---VERY TIGHT FINGERS     GOALS:   IMPROVED FINGER ROM     OBJECTIVE:  SEE FLOW SHEET FOR FINGER ROM     ASSESSMENT: DEBILITY--WITH HAND    PLAN AND TREATMENT:  SEE FLOW SHEET PROGRAM IN CHART:    1-2 X WEEKLY

## 2024-06-07 ENCOUNTER — TREATMENT (OUTPATIENT)
Dept: PHYSICAL THERAPY | Facility: CLINIC | Age: 69
End: 2024-06-07
Payer: COMMERCIAL

## 2024-06-07 DIAGNOSIS — M24.541 CONTRACTURE OF HAND JOINT, RIGHT: Primary | ICD-10-CM

## 2024-06-07 PROCEDURE — 97110 THERAPEUTIC EXERCISES: CPT | Performed by: PHYSICAL THERAPIST

## 2024-06-07 PROCEDURE — 97035 APP MDLTY 1+ULTRASOUND EA 15: CPT | Performed by: PHYSICAL THERAPIST

## 2024-06-07 PROCEDURE — 97140 MANUAL THERAPY 1/> REGIONS: CPT | Performed by: PHYSICAL THERAPIST

## 2024-06-07 NOTE — PROGRESS NOTES
Physical Therapy      Physical Therapy Treatment      Patient Name: Valdemar Cohen    MRN:[id number]     Today's Date:6/7/24     REFERRING DR Chapman/Ronaldo       SUBJECTIVE:  pt states hand hurts and he is frustrated he went thru surgery a second time and results seem worse            GOALS:  more movement of his hand and no pain           OBJECTIVE:  full tx see exercise flowsheet             ASSESSMENT: Lots of education made patient pretty sore and worked on each finger and thumb individually with STM/active/passive stretching, made patient a towel roll to actively prop on while sitting and resting , he does not seem to understand that just resting with hand flexed is making scar tissue tighter and now engaged second finger and pink into tightness and deviating laterally.           PLAN/TREATMENT/VISIT:     3 x weekly

## 2024-06-10 ENCOUNTER — APPOINTMENT (OUTPATIENT)
Dept: PHYSICAL THERAPY | Facility: CLINIC | Age: 69
End: 2024-06-10
Payer: COMMERCIAL

## 2024-06-12 ENCOUNTER — APPOINTMENT (OUTPATIENT)
Dept: PHYSICAL THERAPY | Facility: CLINIC | Age: 69
End: 2024-06-12
Payer: COMMERCIAL

## 2024-06-12 DIAGNOSIS — M24.541 CONTRACTURE OF HAND JOINT, RIGHT: Primary | ICD-10-CM

## 2024-06-12 PROCEDURE — 97110 THERAPEUTIC EXERCISES: CPT | Performed by: PHYSICAL THERAPIST

## 2024-06-12 PROCEDURE — 97035 APP MDLTY 1+ULTRASOUND EA 15: CPT | Performed by: PHYSICAL THERAPIST

## 2024-06-12 PROCEDURE — 97140 MANUAL THERAPY 1/> REGIONS: CPT | Performed by: PHYSICAL THERAPIST

## 2024-06-12 NOTE — PROGRESS NOTES
Physical Therapy     Physical Therapy Evaluation and Treatment      Patient Name:  Valdemar Cohen   1955     Encounter Diagnosis   Name Primary?    Contracture of hand joint, right Yes        THERAPY VISIT NUMBER:   3    Today's Date:  6-12-24    REFERRING DR. AMBROSIO     SUBJECTIVE:      PAINFUL HAND AND DEBILITY---D DAILY ON FRIDAY  FOR A FOLLOW UP      GOALS:    TOOK PICTURES OF HAND TO DR GUERRERO OFFICE TODAY     OBJECTIVE: DEBILITY     ASSESSMENT:  SEE FLOW SHEET    PLAN AND TREATMENT:  SEE FLOW SHEET PROGRAM IN CHART:     1-3 X WEEKLY

## 2024-06-14 ENCOUNTER — APPOINTMENT (OUTPATIENT)
Dept: PHYSICAL THERAPY | Facility: CLINIC | Age: 69
End: 2024-06-14
Payer: COMMERCIAL

## 2024-06-14 DIAGNOSIS — M24.541 CONTRACTURE OF HAND JOINT, RIGHT: Primary | ICD-10-CM

## 2024-06-14 PROCEDURE — 97110 THERAPEUTIC EXERCISES: CPT | Performed by: PHYSICAL THERAPIST

## 2024-06-14 PROCEDURE — 97140 MANUAL THERAPY 1/> REGIONS: CPT | Performed by: PHYSICAL THERAPIST

## 2024-06-14 NOTE — PROGRESS NOTES
Physical Therapy     Physical Therapy Evaluation and Treatment      Patient Name:  Valdemar Cohen   1955     Encounter Diagnosis   Name Primary?    Contracture of hand joint, right Yes        THERAPY VISIT NUMBER:    4    Today's Date: 6-14-24    REFERRING DR. AMBROSIO     SUBJECTIVE:        PATIENT HAS STIFF  4TH AND 5TH FINGERS     GOALS:   IMPROVED FUNCTIONAL USE     OBJECTIVE:   PAINFUL ROM /USE OF 4TH AND 5TH FINGERS--PO SURGERY     ASSESSMENT:  DEBILITY    PLAN AND TREATMENT:  SEE FLOW SHEET PROGRAM IN CHART:    1-2 X WEEKLY

## 2024-06-17 ENCOUNTER — APPOINTMENT (OUTPATIENT)
Dept: PHYSICAL THERAPY | Facility: CLINIC | Age: 69
End: 2024-06-17
Payer: COMMERCIAL

## 2024-06-17 DIAGNOSIS — M24.541 CONTRACTURE OF HAND JOINT, RIGHT: Primary | ICD-10-CM

## 2024-06-17 PROCEDURE — 97140 MANUAL THERAPY 1/> REGIONS: CPT | Performed by: PHYSICAL THERAPIST

## 2024-06-17 PROCEDURE — 97110 THERAPEUTIC EXERCISES: CPT | Performed by: PHYSICAL THERAPIST

## 2024-06-17 NOTE — PROGRESS NOTES
Physical Therapy     Physical Therapy Evaluation and Treatment      Patient Name:  Valdemar Cohen   1955   Encounter Diagnosis   Name Primary?    Contracture of hand joint, right Yes        THERAPY VISIT NUMBER: 5    Today's Date: 6-17-24    REFERRING DR. AMBROSIO     SUBJECTIVE:      PAIN AND STIFF 4TH 5TH FINGERS--DR AMBROSIO TOLD ME --LONG GAME--AND NO FURTHER SURGERY       GOALS: IMPROVE HAND FINGER ROM---PATIENT DOESNT WANT TO WEAR A SPLINT     OBJECTIVE: SEE ABOVE     ASSESSMENT: DEBILITY    PLAN AND TREATMENT:  SEE FLOW SHEET PROGRAM IN CHART:    1 -2 X WEEKLY

## 2024-06-21 ENCOUNTER — DOCUMENTATION (OUTPATIENT)
Dept: PHYSICAL THERAPY | Facility: CLINIC | Age: 69
End: 2024-06-21

## 2024-06-21 ENCOUNTER — APPOINTMENT (OUTPATIENT)
Dept: PHYSICAL THERAPY | Facility: CLINIC | Age: 69
End: 2024-06-21
Payer: COMMERCIAL

## 2024-06-21 DIAGNOSIS — R35.0 BENIGN PROSTATIC HYPERPLASIA WITH URINARY FREQUENCY: ICD-10-CM

## 2024-06-21 DIAGNOSIS — N40.1 BENIGN PROSTATIC HYPERPLASIA WITH URINARY FREQUENCY: ICD-10-CM

## 2024-06-21 DIAGNOSIS — E11.65 TYPE 2 DIABETES MELLITUS WITH HYPERGLYCEMIA, WITHOUT LONG-TERM CURRENT USE OF INSULIN (MULTI): ICD-10-CM

## 2024-06-21 DIAGNOSIS — I10 ESSENTIAL (PRIMARY) HYPERTENSION: ICD-10-CM

## 2024-06-21 RX ORDER — TADALAFIL 5 MG/1
TABLET ORAL
Qty: 30 TABLET | Refills: 0 | Status: SHIPPED | OUTPATIENT
Start: 2024-06-21

## 2024-06-21 NOTE — PROGRESS NOTES
Physical Therapy                 Therapy Communication Note    Patient Name: Valdemar Cohen  MRN: [id number]  Today's Date: 6/21/2024       Comment:   UNABLE TO ATTEND THERAPY TODAY DUE TO NO TRANSPORTATION

## 2024-06-24 ENCOUNTER — APPOINTMENT (OUTPATIENT)
Dept: PHYSICAL THERAPY | Facility: CLINIC | Age: 69
End: 2024-06-24
Payer: COMMERCIAL

## 2024-06-24 DIAGNOSIS — M24.541 CONTRACTURE OF HAND JOINT, RIGHT: Primary | ICD-10-CM

## 2024-06-24 PROCEDURE — 97110 THERAPEUTIC EXERCISES: CPT | Performed by: PHYSICAL THERAPIST

## 2024-06-24 PROCEDURE — 97140 MANUAL THERAPY 1/> REGIONS: CPT | Performed by: PHYSICAL THERAPIST

## 2024-06-24 NOTE — PROGRESS NOTES
Physical Therapy     Physical Therapy Evaluation and Treatment      Patient Name:  Valdemar Cohen   1955     Encounter Diagnosis   Name Primary?    Contracture of hand joint, right Yes        THERAPY VISIT NUMBER: 6    Today's Date: 6-24-24    REFERRING DR. AMBROSIO     SUBJECTIVE:       DEBILITY WITH 4TH/5TH FINGER ROM     GOALS: SEE FLOW SHEET     OBJECTIVE: SEE FLOW SHEET     ASSESSMENT:    SEE FLOW SHEET    PLAN AND TREATMENT:  SEE FLOW SHEET PROGRAM IN CHART:    1 - 3 X WEEKLY

## 2024-06-28 ENCOUNTER — APPOINTMENT (OUTPATIENT)
Dept: PHYSICAL THERAPY | Facility: CLINIC | Age: 69
End: 2024-06-28
Payer: COMMERCIAL

## 2024-07-01 ENCOUNTER — APPOINTMENT (OUTPATIENT)
Dept: PHYSICAL THERAPY | Facility: CLINIC | Age: 69
End: 2024-07-01
Payer: COMMERCIAL

## 2024-07-02 ENCOUNTER — DOCUMENTATION (OUTPATIENT)
Dept: PHYSICAL THERAPY | Facility: CLINIC | Age: 69
End: 2024-07-02

## 2024-07-02 DIAGNOSIS — M24.541 CONTRACTURE OF HAND JOINT, RIGHT: Primary | ICD-10-CM

## 2024-07-02 NOTE — PROGRESS NOTES
yPhysical Therapy                 Therapy Communication Note    Patient Name: Valdemar Cohen  MRN: [id number]  Today's Date: 7/2/2024     Discipline: Physical Therapy    Missed Visit Reason:  Pt had to be a witness at court    Missed Time: Cancel

## 2024-07-08 ENCOUNTER — APPOINTMENT (OUTPATIENT)
Dept: PHYSICAL THERAPY | Facility: CLINIC | Age: 69
End: 2024-07-08
Payer: COMMERCIAL

## 2024-07-08 ENCOUNTER — DOCUMENTATION (OUTPATIENT)
Dept: PHYSICAL THERAPY | Facility: CLINIC | Age: 69
End: 2024-07-08

## 2024-07-08 DIAGNOSIS — M24.541 CONTRACTURE OF HAND JOINT, RIGHT: Primary | ICD-10-CM

## 2024-07-08 NOTE — PROGRESS NOTES
Physical Therapy                 Therapy Communication Note    Patient Name: Valdemar Cohen  MRN: [id number]  Today's Date: 7/8/2024     Discipline: Physical Therapy    Missed Visit Reason:  out of town     Missed Time: Cancel

## 2024-07-12 ENCOUNTER — APPOINTMENT (OUTPATIENT)
Dept: PRIMARY CARE | Facility: CLINIC | Age: 69
End: 2024-07-12
Payer: COMMERCIAL

## 2024-07-19 DIAGNOSIS — R35.0 BENIGN PROSTATIC HYPERPLASIA WITH URINARY FREQUENCY: ICD-10-CM

## 2024-07-19 DIAGNOSIS — N40.1 BENIGN PROSTATIC HYPERPLASIA WITH URINARY FREQUENCY: ICD-10-CM

## 2024-07-19 DIAGNOSIS — I10 ESSENTIAL (PRIMARY) HYPERTENSION: Primary | ICD-10-CM

## 2024-07-19 DIAGNOSIS — E11.65 TYPE 2 DIABETES MELLITUS WITH HYPERGLYCEMIA, WITHOUT LONG-TERM CURRENT USE OF INSULIN (MULTI): ICD-10-CM

## 2024-07-19 RX ORDER — ISOPROPYL ALCOHOL 0.75 G/1
SWAB TOPICAL
Qty: 300 EACH | Refills: 0 | Status: SHIPPED | OUTPATIENT
Start: 2024-07-19 | End: 2025-07-19

## 2024-08-01 DIAGNOSIS — E11.65 TYPE 2 DIABETES MELLITUS WITH HYPERGLYCEMIA, WITHOUT LONG-TERM CURRENT USE OF INSULIN (MULTI): ICD-10-CM

## 2024-08-01 DIAGNOSIS — N40.1 BENIGN PROSTATIC HYPERPLASIA WITH URINARY FREQUENCY: ICD-10-CM

## 2024-08-01 DIAGNOSIS — I10 ESSENTIAL (PRIMARY) HYPERTENSION: ICD-10-CM

## 2024-08-01 DIAGNOSIS — R35.0 BENIGN PROSTATIC HYPERPLASIA WITH URINARY FREQUENCY: ICD-10-CM

## 2024-08-01 RX ORDER — TADALAFIL 5 MG/1
TABLET ORAL
Qty: 30 TABLET | Refills: 0 | Status: SHIPPED | OUTPATIENT
Start: 2024-08-01

## 2024-08-08 ENCOUNTER — DOCUMENTATION (OUTPATIENT)
Dept: PHYSICAL THERAPY | Facility: CLINIC | Age: 69
End: 2024-08-08
Payer: COMMERCIAL

## 2024-08-08 DIAGNOSIS — M24.541 CONTRACTURE OF HAND JOINT, RIGHT: Primary | ICD-10-CM

## 2024-08-08 NOTE — PROGRESS NOTES
Physical Therapy    Discharge Summary    Name: Valdemar Cohen  MRN: [id number]  : 1955  Date: 24    Discharge Summary: PT        Therapy Summary: HEP/Swimming     Discharge Status: HEP     Rehab Discharge Reason: Other pt had to work and then never came back to PT

## 2024-08-10 DIAGNOSIS — L90.5 SCAR CONDITION AND FIBROSIS OF SKIN: ICD-10-CM

## 2024-08-12 RX ORDER — DOXYCYCLINE HYCLATE 50 MG/1
50 CAPSULE ORAL
Qty: 90 CAPSULE | Refills: 0 | Status: SHIPPED | OUTPATIENT
Start: 2024-08-12

## 2024-08-29 ENCOUNTER — PATIENT OUTREACH (OUTPATIENT)
Dept: CARE COORDINATION | Facility: CLINIC | Age: 69
End: 2024-08-29
Payer: COMMERCIAL

## 2024-09-04 DIAGNOSIS — N40.1 BENIGN PROSTATIC HYPERPLASIA WITH URINARY FREQUENCY: ICD-10-CM

## 2024-09-04 DIAGNOSIS — E11.65 TYPE 2 DIABETES MELLITUS WITH HYPERGLYCEMIA, WITHOUT LONG-TERM CURRENT USE OF INSULIN (MULTI): ICD-10-CM

## 2024-09-04 DIAGNOSIS — R35.0 BENIGN PROSTATIC HYPERPLASIA WITH URINARY FREQUENCY: ICD-10-CM

## 2024-09-04 DIAGNOSIS — I10 ESSENTIAL (PRIMARY) HYPERTENSION: ICD-10-CM

## 2024-09-04 RX ORDER — TADALAFIL 5 MG/1
TABLET ORAL
Qty: 30 TABLET | Refills: 0 | Status: SHIPPED | OUTPATIENT
Start: 2024-09-04

## 2024-09-06 DIAGNOSIS — R35.0 BENIGN PROSTATIC HYPERPLASIA WITH URINARY FREQUENCY: ICD-10-CM

## 2024-09-06 DIAGNOSIS — N40.1 BENIGN PROSTATIC HYPERPLASIA WITH URINARY FREQUENCY: ICD-10-CM

## 2024-09-06 DIAGNOSIS — I10 ESSENTIAL (PRIMARY) HYPERTENSION: ICD-10-CM

## 2024-09-06 DIAGNOSIS — E11.65 TYPE 2 DIABETES MELLITUS WITH HYPERGLYCEMIA, WITHOUT LONG-TERM CURRENT USE OF INSULIN (MULTI): ICD-10-CM

## 2024-09-06 RX ORDER — GABAPENTIN 300 MG/1
CAPSULE ORAL
Qty: 90 CAPSULE | Refills: 0 | Status: SHIPPED | OUTPATIENT
Start: 2024-09-06

## 2024-09-26 DIAGNOSIS — E11.65 TYPE 2 DIABETES MELLITUS WITH HYPERGLYCEMIA, WITHOUT LONG-TERM CURRENT USE OF INSULIN: ICD-10-CM

## 2024-09-26 RX ORDER — EMPAGLIFLOZIN 10 MG/1
10 TABLET, FILM COATED ORAL DAILY
Qty: 30 TABLET | Refills: 0 | Status: SHIPPED | OUTPATIENT
Start: 2024-09-26

## 2024-10-12 DIAGNOSIS — N40.1 BENIGN PROSTATIC HYPERPLASIA WITH URINARY FREQUENCY: ICD-10-CM

## 2024-10-12 DIAGNOSIS — E11.65 TYPE 2 DIABETES MELLITUS WITH HYPERGLYCEMIA, WITHOUT LONG-TERM CURRENT USE OF INSULIN: ICD-10-CM

## 2024-10-12 DIAGNOSIS — I10 ESSENTIAL (PRIMARY) HYPERTENSION: ICD-10-CM

## 2024-10-12 DIAGNOSIS — R35.0 BENIGN PROSTATIC HYPERPLASIA WITH URINARY FREQUENCY: ICD-10-CM

## 2024-10-14 RX ORDER — TADALAFIL 5 MG/1
TABLET ORAL
Qty: 30 TABLET | Refills: 0 | Status: SHIPPED | OUTPATIENT
Start: 2024-10-14

## 2024-10-19 DIAGNOSIS — N40.1 BENIGN PROSTATIC HYPERPLASIA WITH URINARY FREQUENCY: ICD-10-CM

## 2024-10-19 DIAGNOSIS — E11.65 TYPE 2 DIABETES MELLITUS WITH HYPERGLYCEMIA, WITHOUT LONG-TERM CURRENT USE OF INSULIN: ICD-10-CM

## 2024-10-19 DIAGNOSIS — I10 ESSENTIAL (PRIMARY) HYPERTENSION: ICD-10-CM

## 2024-10-19 DIAGNOSIS — R35.0 BENIGN PROSTATIC HYPERPLASIA WITH URINARY FREQUENCY: ICD-10-CM

## 2024-10-21 RX ORDER — GABAPENTIN 300 MG/1
CAPSULE ORAL
Qty: 90 CAPSULE | Refills: 0 | Status: SHIPPED | OUTPATIENT
Start: 2024-10-21

## 2024-11-11 ENCOUNTER — APPOINTMENT (OUTPATIENT)
Dept: PHYSICAL THERAPY | Facility: CLINIC | Age: 69
End: 2024-11-11
Payer: COMMERCIAL

## 2024-11-11 ENCOUNTER — APPOINTMENT (OUTPATIENT)
Dept: PRIMARY CARE | Facility: CLINIC | Age: 69
End: 2024-11-11
Payer: COMMERCIAL

## 2024-11-11 VITALS
HEART RATE: 77 BPM | DIASTOLIC BLOOD PRESSURE: 85 MMHG | RESPIRATION RATE: 16 BRPM | HEIGHT: 70 IN | WEIGHT: 227 LBS | SYSTOLIC BLOOD PRESSURE: 140 MMHG | BODY MASS INDEX: 32.5 KG/M2 | TEMPERATURE: 97.2 F

## 2024-11-11 DIAGNOSIS — R35.0 BENIGN PROSTATIC HYPERPLASIA WITH URINARY FREQUENCY: ICD-10-CM

## 2024-11-11 DIAGNOSIS — E11.65 TYPE 2 DIABETES MELLITUS WITH HYPERGLYCEMIA, WITHOUT LONG-TERM CURRENT USE OF INSULIN: ICD-10-CM

## 2024-11-11 DIAGNOSIS — I10 ESSENTIAL (PRIMARY) HYPERTENSION: ICD-10-CM

## 2024-11-11 DIAGNOSIS — M72.0 DUPUYTREN'S DISEASE: Primary | ICD-10-CM

## 2024-11-11 DIAGNOSIS — N40.1 BENIGN PROSTATIC HYPERPLASIA WITH URINARY FREQUENCY: ICD-10-CM

## 2024-11-11 LAB
POC FINGERSTICK BLOOD GLUCOSE: 81 MG/DL (ref 70–100)
POC HEMOGLOBIN A1C: 5.7 % (ref 4.2–6.5)

## 2024-11-11 PROCEDURE — 3077F SYST BP >= 140 MM HG: CPT | Performed by: FAMILY MEDICINE

## 2024-11-11 PROCEDURE — 82962 GLUCOSE BLOOD TEST: CPT | Performed by: FAMILY MEDICINE

## 2024-11-11 PROCEDURE — 1159F MED LIST DOCD IN RCRD: CPT | Performed by: FAMILY MEDICINE

## 2024-11-11 PROCEDURE — G2211 COMPLEX E/M VISIT ADD ON: HCPCS | Performed by: FAMILY MEDICINE

## 2024-11-11 PROCEDURE — 99214 OFFICE O/P EST MOD 30 MIN: CPT | Performed by: FAMILY MEDICINE

## 2024-11-11 PROCEDURE — 3008F BODY MASS INDEX DOCD: CPT | Performed by: FAMILY MEDICINE

## 2024-11-11 PROCEDURE — 1036F TOBACCO NON-USER: CPT | Performed by: FAMILY MEDICINE

## 2024-11-11 PROCEDURE — 83036 HEMOGLOBIN GLYCOSYLATED A1C: CPT | Performed by: FAMILY MEDICINE

## 2024-11-11 PROCEDURE — 3079F DIAST BP 80-89 MM HG: CPT | Performed by: FAMILY MEDICINE

## 2024-11-11 PROCEDURE — 1160F RVW MEDS BY RX/DR IN RCRD: CPT | Performed by: FAMILY MEDICINE

## 2024-11-11 RX ORDER — METFORMIN HYDROCHLORIDE 500 MG/1
1000 TABLET ORAL
Qty: 360 TABLET | Refills: 3 | Status: SHIPPED | OUTPATIENT
Start: 2024-11-11 | End: 2025-11-11

## 2024-11-11 RX ORDER — HYDROCHLOROTHIAZIDE 12.5 MG/1
1 CAPSULE ORAL
Qty: 6 EACH | Refills: 3 | Status: SHIPPED | OUTPATIENT
Start: 2024-11-11 | End: 2025-11-11

## 2024-11-11 RX ORDER — BLOOD-GLUCOSE,RECEIVER,CONT
EACH MISCELLANEOUS
Qty: 1 EACH | Refills: 0 | Status: SHIPPED | OUTPATIENT
Start: 2024-11-11

## 2024-11-11 RX ORDER — FLUTICASONE PROPIONATE 50 MCG
1 SPRAY, SUSPENSION (ML) NASAL DAILY
Qty: 16 G | Refills: 3 | Status: SHIPPED | OUTPATIENT
Start: 2024-11-11

## 2024-11-11 RX ORDER — TADALAFIL 5 MG/1
5 TABLET ORAL DAILY
Qty: 90 TABLET | Refills: 3 | Status: SHIPPED | OUTPATIENT
Start: 2024-11-11 | End: 2025-11-11

## 2024-11-11 NOTE — ASSESSMENT & PLAN NOTE
Orders:    metFORMIN (Glucophage) 500 mg tablet; Take 2 tablets (1,000 mg) by mouth 2 times daily (morning and late afternoon).    tadalafil (Cialis) 5 mg tablet; Take 1 tablet (5 mg) by mouth once daily.    fluticasone (Flonase) 50 mcg/actuation nasal spray; Administer 1 spray into each nostril once daily. Shake gently. Before first use, prime pump. After use, clean tip and replace cap.

## 2024-11-11 NOTE — PATIENT INSTRUCTIONS
"USE [employer].  CALL FOR NEEDS [phone].   KEEP ON MEDICATIONS  KEEP SPECIALTY APPOINTMENTS.      PT HAS HAD SURGERY FOR CONTRACTURED RT HAND TWICE AND NOT PROGRESSING WITH PHYSICAL THERAPY.    REFERRAL OUT TO ANOTHER SURGEON IS AN OPTION TODAY.  [facility] SEEMS TOO FAR AND \"U CARD\" INSURANCE.   POSSIBLE CANDIDATE FOR PRP.   EXPENSE IS AN ISSUE.    HAVE DR AMBROSIO SEND ME NOTES AT YOUR VISIT TOMORROW.  OR CALL.    "

## 2024-11-11 NOTE — PROGRESS NOTES
"Subjective   Patient ID: Valdemar Cohen is a 69 y.o. male who presents for Follow-up (Discuss referral for hand ).    HPI   Follow-up:   DIABETES:  AIC: EXCELLENT 5.6%    Discuss referral for hand.  HAND SPECIALIST IN [place]:  \"DR HUTSON\" AND HE USED HIS FRIENDS PHONE AND HE HAS NO PHONE AND NO RIDE.  SPOKE WITH SURGEON IN [place] AND NEEDS TO GO THERE FOR CARE.      PRP CANDIDATE?    CANDIDATE FOR AMPUTATION?      Review of Systems   All other systems reviewed and are negative.      Objective   /85 (BP Location: Left arm, Patient Position: Sitting)   Pulse 77   Temp 36.2 °C (97.2 °F) (Temporal)   Resp 16   Ht 1.778 m (5' 10\")   Wt 103 kg (227 lb)   BMI 32.57 kg/m²     Physical Exam  Vitals and nursing note reviewed.   Constitutional:       Appearance: Normal appearance.   HENT:      Head: Normocephalic.      Right Ear: Tympanic membrane, ear canal and external ear normal.      Left Ear: Tympanic membrane, ear canal and external ear normal.      Nose: Nose normal.      Mouth/Throat:      Mouth: Mucous membranes are moist.      Pharynx: Oropharynx is clear.   Eyes:      Conjunctiva/sclera: Conjunctivae normal.      Pupils: Pupils are equal, round, and reactive to light.   Cardiovascular:      Rate and Rhythm: Normal rate and regular rhythm.      Pulses: Normal pulses.      Heart sounds: Normal heart sounds.   Pulmonary:      Effort: Pulmonary effort is normal.      Breath sounds: Normal breath sounds.   Abdominal:      General: Bowel sounds are normal.      Palpations: Abdomen is soft.   Musculoskeletal:         General: Normal range of motion.      Cervical back: Normal range of motion and neck supple.   Skin:     General: Skin is warm and dry.   Neurological:      General: No focal deficit present.      Mental Status: Mental status is at baseline.   Psychiatric:         Mood and Affect: Mood normal.         Behavior: Behavior normal.         Thought Content: Thought content normal.         Judgment: Judgment " normal.         Assessment/Plan   Assessment & Plan  Type 2 diabetes mellitus with hyperglycemia, without long-term current use of insulin    Orders:    POCT glycosylated hemoglobin (Hb A1C) manually resulted    POCT Fingerstick Glucose manually resulted    empagliflozin (Jardiance) 10 mg; Take 1 tablet (10 mg) by mouth once daily.    metFORMIN (Glucophage) 500 mg tablet; Take 2 tablets (1,000 mg) by mouth 2 times daily (morning and late afternoon).    tadalafil (Cialis) 5 mg tablet; Take 1 tablet (5 mg) by mouth once daily.    fluticasone (Flonase) 50 mcg/actuation nasal spray; Administer 1 spray into each nostril once daily. Shake gently. Before first use, prime pump. After use, clean tip and replace cap.    Follow Up In Primary Care - Established; Future    Follow Up In Primary Care - Established; Future    blood-glucose sensor (FreeStyle Sanjeev 3 Plus Sensor) device; 1 each every 14 (fourteen) days. SANJEEV 3 PLUS    FreeStyle Sanjeev 3 [place] misc; Use as instructed    Dupuytren's disease    Orders:    Referral to Orthopaedic Surgery; Future    Referral to Orthopaedic Surgery; Future    Follow Up In Primary Care - Established; Future    Follow Up In Primary Care - Established; Future    Essential (primary) hypertension    Orders:    metFORMIN (Glucophage) 500 mg tablet; Take 2 tablets (1,000 mg) by mouth 2 times daily (morning and late afternoon).    tadalafil (Cialis) 5 mg tablet; Take 1 tablet (5 mg) by mouth once daily.    fluticasone (Flonase) 50 mcg/actuation nasal spray; Administer 1 spray into each nostril once daily. Shake gently. Before first use, prime pump. After use, clean tip and replace cap.    Benign prostatic hyperplasia with urinary frequency    Orders:    metFORMIN (Glucophage) 500 mg tablet; Take 2 tablets (1,000 mg) by mouth 2 times daily (morning and late afternoon).    tadalafil (Cialis) 5 mg tablet; Take 1 tablet (5 mg) by mouth once daily.    fluticasone (Flonase) 50 mcg/actuation nasal  spray; Administer 1 spray into each nostril once daily. Shake gently. Before first use, prime pump. After use, clean tip and replace cap.

## 2024-11-11 NOTE — ASSESSMENT & PLAN NOTE
Orders:    Referral to Orthopaedic Surgery; Future    Referral to Orthopaedic Surgery; Future    Follow Up In Primary Care - Established; Future    Follow Up In Primary Care - Established; Future

## 2024-11-20 ENCOUNTER — APPOINTMENT (OUTPATIENT)
Dept: ORTHOPEDIC SURGERY | Age: 69
End: 2024-11-20
Payer: COMMERCIAL

## 2024-11-27 ENCOUNTER — APPOINTMENT (OUTPATIENT)
Dept: ORTHOPEDIC SURGERY | Facility: CLINIC | Age: 69
End: 2024-11-27
Payer: COMMERCIAL

## 2024-12-04 DIAGNOSIS — N40.1 BENIGN PROSTATIC HYPERPLASIA WITH URINARY FREQUENCY: ICD-10-CM

## 2024-12-04 DIAGNOSIS — R35.0 BENIGN PROSTATIC HYPERPLASIA WITH URINARY FREQUENCY: ICD-10-CM

## 2024-12-04 DIAGNOSIS — I10 ESSENTIAL (PRIMARY) HYPERTENSION: ICD-10-CM

## 2024-12-04 DIAGNOSIS — E11.65 TYPE 2 DIABETES MELLITUS WITH HYPERGLYCEMIA, WITHOUT LONG-TERM CURRENT USE OF INSULIN: ICD-10-CM

## 2024-12-04 RX ORDER — GABAPENTIN 300 MG/1
300 CAPSULE ORAL 3 TIMES DAILY
Qty: 90 CAPSULE | Refills: 0 | Status: SHIPPED | OUTPATIENT
Start: 2024-12-04

## 2024-12-11 ENCOUNTER — APPOINTMENT (OUTPATIENT)
Dept: ORTHOPEDIC SURGERY | Facility: CLINIC | Age: 69
End: 2024-12-11
Payer: COMMERCIAL

## 2024-12-11 VITALS — HEIGHT: 72 IN | WEIGHT: 227 LBS | BODY MASS INDEX: 30.75 KG/M2

## 2024-12-11 DIAGNOSIS — M72.0 DUPUYTREN'S DISEASE: Primary | ICD-10-CM

## 2024-12-11 DIAGNOSIS — S66.809A INJURY OF FLEXOR TENDON OF HAND, INITIAL ENCOUNTER: ICD-10-CM

## 2024-12-11 DIAGNOSIS — M79.641 RIGHT HAND PAIN: ICD-10-CM

## 2024-12-11 PROCEDURE — 3008F BODY MASS INDEX DOCD: CPT | Performed by: ORTHOPAEDIC SURGERY

## 2024-12-11 PROCEDURE — 99204 OFFICE O/P NEW MOD 45 MIN: CPT | Performed by: ORTHOPAEDIC SURGERY

## 2024-12-11 PROCEDURE — 1160F RVW MEDS BY RX/DR IN RCRD: CPT | Performed by: ORTHOPAEDIC SURGERY

## 2024-12-11 PROCEDURE — 1159F MED LIST DOCD IN RCRD: CPT | Performed by: ORTHOPAEDIC SURGERY

## 2024-12-11 PROCEDURE — 1036F TOBACCO NON-USER: CPT | Performed by: ORTHOPAEDIC SURGERY

## 2024-12-11 ASSESSMENT — PAIN - FUNCTIONAL ASSESSMENT: PAIN_FUNCTIONAL_ASSESSMENT: NO/DENIES PAIN

## 2024-12-11 NOTE — PROGRESS NOTES
Valdemar Cohen is coming in with dypretens contracture in digits 3-5. Hx of 2 previous Sx's; 2023 and 2024. No Hx of trauma to the area. denies numbness/tingling.  XR done 11/20/2024 and are in PACS.

## 2024-12-11 NOTE — PROGRESS NOTES
69 y.o. male presents today for evaluation of right hand ring and small finger contractures. The patient reports he has had 2 surgeries on the right hand, 1 towards the end of 2023, and the other 1 at the beginning of 2024.  Both were to remove Dupuytren's contracture from his right ring finger.  He would after the first surgery he developed severe scar tissue.  He did go through therapy.  His orthopedic hand surgeon then took him back for a second surgery to try to remove scar tissue.  He did further therapy thereafter but continues having issues getting his hand fully open or making a fist. Pain is controlled. Patient reports no numbness and tingling.  Reports minimal to no pain but difficulty with use of the right hand.  Unable to  things easily.  Difficult getting his hand into the pocket.  States he cannot get it flat on a table.    Review of Systems    Constitutional: see HPI, no fever, no chills, not feeling tired, no significant weight gain or weight loss.   Eyes: No vision changes  ENT: no nosebleeds.   Cardiovascular: no chest pain.   Respiratory: no shortness of breath and no cough.   Gastrointestinal: no abdominal pain, no nausea, no vomiting and no diarrhea.   Musculoskeletal: per HPI  Neurological: no headache, no gait disturbances  Psychiatric: no depression and no sleep disturbances.   Endocrine: no muscle weakness and no muscle cramps.   Hematologic/Lymphatic: no swollen glands and no tendency for easy bruising or excessive swelling.     Patient's past medical history, past surgical history, allergies, and medications have been reviewed unless otherwise noted in the chart.     Dupuytrens Exam  Digit: Right ring and small fingers inspection:  positive palmar cord, no evidence of infection, Palpation:  compartments are soft, no tenderness with palpation, Range of Motion:  motion limited by cord, near full flexion of index, long, small fingers, however minimal to no PIP or DIP flexion of the ring  finger degree of MP contracture:  45 degree of PIP contracture:  0 Stability:  no digital instability, Strength:  5/5 extension, 5 out of 5 flexion except for ring finger which is limited skin:  intact, Vascular:  capillary refill < 2 seconds distally, Sensation:  sensation intact to light touch distally.  Positive tabletop test.     Constitutional   General appearance: Alert and in no acute distress. Well developed, well nourished.    Eyes   External Eye, Conjunctiva and lids: Normal external exam - pupils were equal in size, round, reactive to light (PERRL) with normal accommodation and extraocular movements intact (EOMI).   Ears, Nose, Mouth, and Throat   Hearing: Normal.   Neck   Neck: No neck mass was observed. Supple.   Pulmonary   Respiratory effort: No respiratory distress.   Cardiovascular   Examination of extremities: No peripheral edema.   Psychiatric   Judgment and insight: Intact.   Orientation to person, place, and time: Alert and oriented x 3.       Mood and affect: Normal.      Status post 2 surgeries for a right ring finger Dupuytren's contracture partial palmar fasciectomy  Based on the history, physical exam and imaging studies above, the patient's presentation is consistent with the above diagnosis.  I had a long discussion with the patient regarding their presentation and the treatment options.  We discussed initial nonoperative versus operative management options as well as potential further diagnostic imaging.  We again discussed her treatment options going forward along with their associated risks and benefits. After thorough discussion, the patient has elected to proceed with conservative management. All questions were answered to the patients satisfaction who seems satisfied with the plan.  They will call the office with any questions/concerns.    MRI -patient is unable to flex the ring finger at all so therefore I would like to get an MRI to evaluate the integrity of the flexor  tendons  Follow-up after MRI

## 2024-12-18 ENCOUNTER — APPOINTMENT (OUTPATIENT)
Dept: ORTHOPEDIC SURGERY | Facility: CLINIC | Age: 69
End: 2024-12-18
Payer: COMMERCIAL

## 2024-12-28 ENCOUNTER — HOSPITAL ENCOUNTER (OUTPATIENT)
Dept: RADIOLOGY | Facility: HOSPITAL | Age: 69
Discharge: HOME | End: 2024-12-28
Payer: COMMERCIAL

## 2024-12-28 DIAGNOSIS — M79.641 RIGHT HAND PAIN: ICD-10-CM

## 2024-12-28 DIAGNOSIS — M72.0 DUPUYTREN'S DISEASE: ICD-10-CM

## 2024-12-28 DIAGNOSIS — S66.809A INJURY OF FLEXOR TENDON OF HAND, INITIAL ENCOUNTER: ICD-10-CM

## 2024-12-28 PROCEDURE — 73218 MRI UPPER EXTREMITY W/O DYE: CPT | Mod: RIGHT SIDE | Performed by: STUDENT IN AN ORGANIZED HEALTH CARE EDUCATION/TRAINING PROGRAM

## 2024-12-28 PROCEDURE — 73218 MRI UPPER EXTREMITY W/O DYE: CPT | Mod: 52,RT

## 2024-12-31 DIAGNOSIS — I10 ESSENTIAL (PRIMARY) HYPERTENSION: ICD-10-CM

## 2024-12-31 DIAGNOSIS — E11.65 TYPE 2 DIABETES MELLITUS WITH HYPERGLYCEMIA, WITHOUT LONG-TERM CURRENT USE OF INSULIN: ICD-10-CM

## 2024-12-31 DIAGNOSIS — N40.1 BENIGN PROSTATIC HYPERPLASIA WITH URINARY FREQUENCY: ICD-10-CM

## 2024-12-31 DIAGNOSIS — R35.0 BENIGN PROSTATIC HYPERPLASIA WITH URINARY FREQUENCY: ICD-10-CM

## 2025-01-02 RX ORDER — GABAPENTIN 300 MG/1
300 CAPSULE ORAL 3 TIMES DAILY
Qty: 90 CAPSULE | Refills: 0 | Status: SHIPPED | OUTPATIENT
Start: 2025-01-02

## 2025-02-04 DIAGNOSIS — I10 ESSENTIAL (PRIMARY) HYPERTENSION: ICD-10-CM

## 2025-02-04 DIAGNOSIS — E11.65 TYPE 2 DIABETES MELLITUS WITH HYPERGLYCEMIA, WITHOUT LONG-TERM CURRENT USE OF INSULIN: ICD-10-CM

## 2025-02-04 DIAGNOSIS — N40.1 BENIGN PROSTATIC HYPERPLASIA WITH URINARY FREQUENCY: ICD-10-CM

## 2025-02-04 DIAGNOSIS — R35.0 BENIGN PROSTATIC HYPERPLASIA WITH URINARY FREQUENCY: ICD-10-CM

## 2025-02-04 RX ORDER — GABAPENTIN 300 MG/1
300 CAPSULE ORAL 3 TIMES DAILY
Qty: 90 CAPSULE | Refills: 0 | Status: SHIPPED | OUTPATIENT
Start: 2025-02-04

## 2025-02-11 ENCOUNTER — APPOINTMENT (OUTPATIENT)
Dept: PRIMARY CARE | Facility: CLINIC | Age: 70
End: 2025-02-11
Payer: COMMERCIAL

## 2025-02-20 ENCOUNTER — TELEPHONE (OUTPATIENT)
Dept: PRIMARY CARE | Facility: CLINIC | Age: 70
End: 2025-02-20
Payer: COMMERCIAL

## 2025-03-04 DIAGNOSIS — E11.65 TYPE 2 DIABETES MELLITUS WITH HYPERGLYCEMIA, WITHOUT LONG-TERM CURRENT USE OF INSULIN: ICD-10-CM

## 2025-03-04 DIAGNOSIS — I10 ESSENTIAL (PRIMARY) HYPERTENSION: ICD-10-CM

## 2025-03-04 DIAGNOSIS — R35.0 BENIGN PROSTATIC HYPERPLASIA WITH URINARY FREQUENCY: ICD-10-CM

## 2025-03-04 DIAGNOSIS — N40.1 BENIGN PROSTATIC HYPERPLASIA WITH URINARY FREQUENCY: ICD-10-CM

## 2025-03-05 RX ORDER — GABAPENTIN 300 MG/1
300 CAPSULE ORAL 3 TIMES DAILY
Qty: 90 CAPSULE | Refills: 0 | Status: SHIPPED | OUTPATIENT
Start: 2025-03-05

## 2025-03-28 DIAGNOSIS — R35.0 BENIGN PROSTATIC HYPERPLASIA WITH URINARY FREQUENCY: ICD-10-CM

## 2025-03-28 DIAGNOSIS — E78.5 HYPERLIPIDEMIA, UNSPECIFIED HYPERLIPIDEMIA TYPE: ICD-10-CM

## 2025-03-28 DIAGNOSIS — N40.1 BENIGN PROSTATIC HYPERPLASIA WITH URINARY FREQUENCY: ICD-10-CM

## 2025-03-28 DIAGNOSIS — E11.65 TYPE 2 DIABETES MELLITUS WITH HYPERGLYCEMIA, WITHOUT LONG-TERM CURRENT USE OF INSULIN: ICD-10-CM

## 2025-03-28 DIAGNOSIS — I10 ESSENTIAL (PRIMARY) HYPERTENSION: ICD-10-CM

## 2025-03-28 RX ORDER — ATORVASTATIN CALCIUM 10 MG/1
10 TABLET, FILM COATED ORAL DAILY
Qty: 90 TABLET | Refills: 0 | Status: SHIPPED | OUTPATIENT
Start: 2025-03-28

## 2025-04-10 ENCOUNTER — OFFICE VISIT (OUTPATIENT)
Dept: PRIMARY CARE | Facility: CLINIC | Age: 70
End: 2025-04-10
Payer: COMMERCIAL

## 2025-04-10 VITALS
DIASTOLIC BLOOD PRESSURE: 77 MMHG | TEMPERATURE: 97.9 F | SYSTOLIC BLOOD PRESSURE: 127 MMHG | BODY MASS INDEX: 31.15 KG/M2 | OXYGEN SATURATION: 96 % | HEART RATE: 67 BPM | WEIGHT: 230 LBS | HEIGHT: 72 IN

## 2025-04-10 DIAGNOSIS — I10 ESSENTIAL (PRIMARY) HYPERTENSION: ICD-10-CM

## 2025-04-10 DIAGNOSIS — E11.69 TYPE 2 DIABETES MELLITUS WITH OTHER SPECIFIED COMPLICATION, WITHOUT LONG-TERM CURRENT USE OF INSULIN: ICD-10-CM

## 2025-04-10 DIAGNOSIS — E11.65 TYPE 2 DIABETES MELLITUS WITH HYPERGLYCEMIA, WITHOUT LONG-TERM CURRENT USE OF INSULIN: ICD-10-CM

## 2025-04-10 DIAGNOSIS — Z12.11 SPECIAL SCREENING FOR MALIGNANT NEOPLASMS, COLON: ICD-10-CM

## 2025-04-10 DIAGNOSIS — M72.0 DUPUYTREN'S DISEASE: Primary | ICD-10-CM

## 2025-04-10 DIAGNOSIS — R35.0 BENIGN PROSTATIC HYPERPLASIA WITH URINARY FREQUENCY: ICD-10-CM

## 2025-04-10 DIAGNOSIS — L90.5 SCAR CONDITION AND FIBROSIS OF SKIN: ICD-10-CM

## 2025-04-10 DIAGNOSIS — N40.1 BENIGN PROSTATIC HYPERPLASIA WITH URINARY FREQUENCY: ICD-10-CM

## 2025-04-10 LAB
POC FINGERSTICK BLOOD GLUCOSE: 128 MG/DL (ref 70–100)
POC HEMOGLOBIN A1C: 7.8 % (ref 4.2–6.5)

## 2025-04-10 PROCEDURE — 3074F SYST BP LT 130 MM HG: CPT | Performed by: FAMILY MEDICINE

## 2025-04-10 PROCEDURE — 83036 HEMOGLOBIN GLYCOSYLATED A1C: CPT | Performed by: FAMILY MEDICINE

## 2025-04-10 PROCEDURE — 99214 OFFICE O/P EST MOD 30 MIN: CPT | Performed by: FAMILY MEDICINE

## 2025-04-10 PROCEDURE — 3078F DIAST BP <80 MM HG: CPT | Performed by: FAMILY MEDICINE

## 2025-04-10 PROCEDURE — 1159F MED LIST DOCD IN RCRD: CPT | Performed by: FAMILY MEDICINE

## 2025-04-10 PROCEDURE — 3051F HG A1C>EQUAL 7.0%<8.0%: CPT | Performed by: FAMILY MEDICINE

## 2025-04-10 PROCEDURE — 1036F TOBACCO NON-USER: CPT | Performed by: FAMILY MEDICINE

## 2025-04-10 PROCEDURE — G2211 COMPLEX E/M VISIT ADD ON: HCPCS | Performed by: FAMILY MEDICINE

## 2025-04-10 PROCEDURE — 82962 GLUCOSE BLOOD TEST: CPT | Performed by: FAMILY MEDICINE

## 2025-04-10 PROCEDURE — 1160F RVW MEDS BY RX/DR IN RCRD: CPT | Performed by: FAMILY MEDICINE

## 2025-04-10 PROCEDURE — 3008F BODY MASS INDEX DOCD: CPT | Performed by: FAMILY MEDICINE

## 2025-04-10 RX ORDER — GABAPENTIN 300 MG/1
300 CAPSULE ORAL 3 TIMES DAILY
Qty: 90 CAPSULE | Refills: 6 | Status: SHIPPED | OUTPATIENT
Start: 2025-04-10

## 2025-04-10 ASSESSMENT — PATIENT HEALTH QUESTIONNAIRE - PHQ9
2. FEELING DOWN, DEPRESSED OR HOPELESS: NOT AT ALL
SUM OF ALL RESPONSES TO PHQ9 QUESTIONS 1 AND 2: 0
1. LITTLE INTEREST OR PLEASURE IN DOING THINGS: NOT AT ALL

## 2025-04-10 NOTE — PROGRESS NOTES
Subjective   Patient ID: Vadlemar Cohen is a 69 y.o. male who presents for Follow-up (Needs referral for hand, medication refill).    HPI   Follow-up (Needs referral for hand care for PRP THERAPY.  SEEN X3 BY DR CRAWLEY.  MRI WAS ORDERED AND I COULD SIT STILL IN THERE.  PT NEEDS OPEN MRI AND UPSET.    PT ASKED FOR SEDATION FOR HIS MRI.    This is old hx.  Now s/p 2 hand surgeries with dr fuentes and failed phys tx after the first tx w dr fuentes now got better surg #2 with dr fuentes and did do phys tx.    JORGITO AND MAILE MENTIONED PRP THERAPY.    LAST HAND MRI 12/28/2025.      ? [facility]MM[facility]OLOGY?  [email or web address]IND HAND PRP THERAPY: [phone].     PT HAD SURGERY YESTERDAY ON HIS LEFT FACE CORNER OF LEFT EYE.  SKIN CANCER DR HARTMAN IN [facility].    WOUND LOOKS GOOD TODAY.  NO NOTE IN EPIC.    DR YOLY YOU CALL [phone] KNOWS PT w/ SURGERY FACE 09 APRIL 2025 NOTES TO PCP PLEASE.     AIC TRENDS : 2024: 8.7% THEN 6.8% THEN 5.7% NOW 7.8%  10/25-PGS    Medication refill  ADRYAN 300 TID #90 NEVER WITH REFILLS WHY DR CENTENO?  I ADDED #4 REFILLS TODAY.  NOT ON STEROIDS    TDAP IS DUE.      BAG OF SUPPLEMENTS:    MAY NOT BE WORTH IT. NERVIVA, OIL OF WILD OREGANO, SAMBUCUS ELDERBERRY:     VIT C IS OK     Review of Systems   All other systems reviewed and are negative.    BORDERLINE ELEVATED BP TODAY.      Objective   BP (!) 134/94   Pulse 67   Temp 36.6 °C (97.9 °F)   Ht 1.829 m (6')   Wt 104 kg (230 lb)   SpO2 96%   BMI 31.19 kg/m²     Physical Exam  Vitals and nursing note reviewed.   Constitutional:       Appearance: Normal appearance.      Comments: SUTURED WOUND LATERAL LEFT EYE TOWARD LATERAL ZYGOM INTACT AND SOME DRIED BLOOD.  MISSING TEETH AND BP REPEATED 127/77 LUE.  CTAB RRR NO MRG.  SOFT OBESE ABD AND NO DEP EDEMA BENIGN EXAM,.  CONTRACTURED RT PALM ALONG RAY 4&5.  SKIN INTACT BUT TIGHT TENDONS.     HENT:      Head: Normocephalic.      Right Ear: Tympanic membrane, ear canal and  external ear normal.      Left Ear: Tympanic membrane, ear canal and external ear normal.      Nose: Nose normal.      Mouth/Throat:      Mouth: Mucous membranes are moist.      Pharynx: Oropharynx is clear.   Eyes:      Conjunctiva/sclera: Conjunctivae normal.      Pupils: Pupils are equal, round, and reactive to light.   Cardiovascular:      Rate and Rhythm: Normal rate and regular rhythm.      Pulses: Normal pulses.      Heart sounds: Normal heart sounds.   Pulmonary:      Effort: Pulmonary effort is normal.      Breath sounds: Normal breath sounds.   Abdominal:      General: Bowel sounds are normal.      Palpations: Abdomen is soft.   Musculoskeletal:         General: Normal range of motion.      Cervical back: Normal range of motion and neck supple.   Skin:     General: Skin is warm and dry.   Neurological:      General: No focal deficit present.      Mental Status: Mental status is at baseline.   Psychiatric:         Mood and Affect: Mood normal.         Behavior: Behavior normal.         Thought Content: Thought content normal.         Judgment: Judgment normal.         Assessment/Plan   Assessment & Plan  Type 2 diabetes mellitus with hyperglycemia, without long-term current use of insulin    Orders:    POCT glycosylated hemoglobin (Hb A1C) manually resulted    POCT Fingerstick Glucose manually resulted    gabapentin (Neurontin) 300 mg capsule; Take 1 capsule (300 mg) by mouth 3 times a day.    Lipid panel; Future    Cologuard® colon cancer screening; Future    Albumin-Creatinine Ratio, Urine Random; Future    Referral to Ophthalmology; Future    Dupuytren's disease    Orders:    Referral to Immunology; Future    Type 2 diabetes mellitus with other specified complication, without long-term current use of insulin         Scar condition and fibrosis of skin    Orders:    Referral to Dermatology    Essential (primary) hypertension    Orders:    gabapentin (Neurontin) 300 mg capsule; Take 1 capsule (300 mg) by  mouth 3 times a day.    Benign prostatic hyperplasia with urinary frequency    Orders:    gabapentin (Neurontin) 300 mg capsule; Take 1 capsule (300 mg) by mouth 3 times a day.    Special screening for malignant neoplasms, colon    Orders:    Cologuard® colon cancer screening; Future

## 2025-04-10 NOTE — PATIENT INSTRUCTIONS
USE [employer].  CALL FOR NEEDS [phone].   KEEP ON MEDICATIONS  KEEP SPECIALTY APPOINTMENTS.    [facility] CALL  OPHTHALMOLOGY DR KYLEE ROCHA [phone]..   ? IMMUNOLOGY?  CALL [facility]YA [facility]  [facility] TO FIND HAND PRP THERAPY: [phone].     KEEP ON DIET AND MEDICATIONS FOR DIABETES:  AIC TRENDS : 2024: 8.7% THEN 6.8% THEN 5.7% NOW 7.8%12 Units/10/25.      SUPPLEMENTS MAY NOT BE WORTH IT. SHOULD BE SAFE BUT GET EXPENSIVE.

## 2025-04-14 DIAGNOSIS — C44.329 SQUAMOUS CELL CARCINOMA OF CHIN: Primary | ICD-10-CM

## 2025-04-22 DIAGNOSIS — R35.0 BENIGN PROSTATIC HYPERPLASIA WITH URINARY FREQUENCY: ICD-10-CM

## 2025-04-22 DIAGNOSIS — E11.65 TYPE 2 DIABETES MELLITUS WITH HYPERGLYCEMIA, WITHOUT LONG-TERM CURRENT USE OF INSULIN: ICD-10-CM

## 2025-04-22 DIAGNOSIS — N40.1 BENIGN PROSTATIC HYPERPLASIA WITH URINARY FREQUENCY: ICD-10-CM

## 2025-04-22 DIAGNOSIS — I10 ESSENTIAL (PRIMARY) HYPERTENSION: ICD-10-CM

## 2025-04-22 RX ORDER — TAMSULOSIN HYDROCHLORIDE 0.4 MG/1
CAPSULE ORAL
Qty: 180 CAPSULE | Refills: 0 | Status: SHIPPED | OUTPATIENT
Start: 2025-04-22

## 2025-04-24 ENCOUNTER — TELEPHONE (OUTPATIENT)
Dept: PRIMARY CARE | Facility: CLINIC | Age: 70
End: 2025-04-24
Payer: COMMERCIAL

## 2025-04-24 DIAGNOSIS — E11.69 TYPE 2 DIABETES MELLITUS WITH OTHER SPECIFIED COMPLICATION, WITHOUT LONG-TERM CURRENT USE OF INSULIN: ICD-10-CM

## 2025-04-24 DIAGNOSIS — M72.0 DUPUYTREN'S DISEASE: ICD-10-CM

## 2025-04-24 DIAGNOSIS — L90.5 SCAR CONDITION AND FIBROSIS OF SKIN: Primary | ICD-10-CM

## 2025-04-24 LAB — NONINV COLON CA DNA+OCC BLD SCRN STL QL: NEGATIVE

## 2025-04-24 NOTE — TELEPHONE ENCOUNTER
----- Message from Alana CISNEROS sent at 4/24/2025  9:25 AM EDT -----  Regarding: PRP THERAPY  After some research, I have learned that Dr Luciano Gray  [facility] is the physician to schedule for consult for  PRP Therapy.   Your original order is put under immunology.  Can you please put in new order for  [facility]  PRP Referral  and then I will get in touch with  Valdemar and give him the office  information to call to schedule.   He  does see patients at the [facility] building.   So, that should be convenient for Valdemar.    4.24.25: 1956 HRS: [facility] referral for PRP TX TO PALMAR CONTRACTURE CALL [phone]. PGS

## 2025-05-01 ENCOUNTER — TELEPHONE (OUTPATIENT)
Dept: PRIMARY CARE | Facility: CLINIC | Age: 70
End: 2025-05-01
Payer: COMMERCIAL

## 2025-05-01 NOTE — TELEPHONE ENCOUNTER
----- Message from Steffen Chapman sent at 4/28/2025  9:15 PM EDT -----  GOOD NEWS: NEGATIVE COLOGUARD:  RETEST IN 3 YRS = 2028.   ----- Message -----  From: Linnette Otto MA  Sent: 4/10/2025   8:15 AM EDT  To: Steffen Chapman MD

## 2025-05-31 DIAGNOSIS — E11.69 TYPE 2 DIABETES MELLITUS WITH OTHER SPECIFIED COMPLICATION, WITHOUT LONG-TERM CURRENT USE OF INSULIN: ICD-10-CM

## 2025-06-02 RX ORDER — ORAL SEMAGLUTIDE 7 MG/1
7 TABLET ORAL DAILY
Qty: 90 TABLET | Refills: 0 | Status: SHIPPED | OUTPATIENT
Start: 2025-06-02

## 2025-06-04 ENCOUNTER — APPOINTMENT (OUTPATIENT)
Dept: ORTHOPEDIC SURGERY | Age: 70
End: 2025-06-04
Payer: COMMERCIAL

## 2025-06-23 DIAGNOSIS — I10 ESSENTIAL (PRIMARY) HYPERTENSION: ICD-10-CM

## 2025-06-23 DIAGNOSIS — N40.1 BENIGN PROSTATIC HYPERPLASIA WITH URINARY FREQUENCY: ICD-10-CM

## 2025-06-23 DIAGNOSIS — E11.65 TYPE 2 DIABETES MELLITUS WITH HYPERGLYCEMIA, WITHOUT LONG-TERM CURRENT USE OF INSULIN: ICD-10-CM

## 2025-06-23 DIAGNOSIS — R35.0 BENIGN PROSTATIC HYPERPLASIA WITH URINARY FREQUENCY: ICD-10-CM

## 2025-06-23 RX ORDER — GLIMEPIRIDE 4 MG/1
4 TABLET ORAL 2 TIMES DAILY
Qty: 180 TABLET | Refills: 0 | Status: SHIPPED | OUTPATIENT
Start: 2025-06-23

## 2025-06-30 DIAGNOSIS — E78.5 HYPERLIPIDEMIA, UNSPECIFIED HYPERLIPIDEMIA TYPE: ICD-10-CM

## 2025-06-30 DIAGNOSIS — E11.65 TYPE 2 DIABETES MELLITUS WITH HYPERGLYCEMIA, WITHOUT LONG-TERM CURRENT USE OF INSULIN: ICD-10-CM

## 2025-06-30 DIAGNOSIS — N40.1 BENIGN PROSTATIC HYPERPLASIA WITH URINARY FREQUENCY: ICD-10-CM

## 2025-06-30 DIAGNOSIS — I10 ESSENTIAL (PRIMARY) HYPERTENSION: ICD-10-CM

## 2025-06-30 DIAGNOSIS — R35.0 BENIGN PROSTATIC HYPERPLASIA WITH URINARY FREQUENCY: ICD-10-CM

## 2025-06-30 RX ORDER — ATORVASTATIN CALCIUM 10 MG/1
10 TABLET, FILM COATED ORAL DAILY
Qty: 90 TABLET | Refills: 0 | Status: SHIPPED | OUTPATIENT
Start: 2025-06-30

## 2025-07-21 ENCOUNTER — TELEPHONE (OUTPATIENT)
Dept: PRIMARY CARE | Facility: CLINIC | Age: 70
End: 2025-07-21
Payer: COMMERCIAL

## 2025-07-21 NOTE — TELEPHONE ENCOUNTER
Pt received letter for jury duty. 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 Has health conditions. Can you excuse with a letter. Please call pt with answer.

## 2025-07-23 NOTE — TELEPHONE ENCOUNTER
Left message for patient to call [employer] at [phone]. They can not find patients summons letter. He must give his badge number for them to match.

## 2025-07-25 DIAGNOSIS — E11.69 TYPE 2 DIABETES MELLITUS WITH OTHER SPECIFIED COMPLICATION, WITHOUT LONG-TERM CURRENT USE OF INSULIN: Primary | ICD-10-CM

## 2025-07-28 RX ORDER — BLOOD-GLUCOSE SENSOR
EACH MISCELLANEOUS
Qty: 7 EACH | Refills: 11 | Status: SHIPPED | OUTPATIENT
Start: 2025-07-28

## 2025-08-14 DIAGNOSIS — E11.69 TYPE 2 DIABETES MELLITUS WITH OTHER SPECIFIED COMPLICATION, WITHOUT LONG-TERM CURRENT USE OF INSULIN: ICD-10-CM

## 2025-08-14 RX ORDER — BLOOD-GLUCOSE SENSOR
EACH MISCELLANEOUS
Qty: 7 EACH | Refills: 11 | Status: SHIPPED | OUTPATIENT
Start: 2025-08-14